# Patient Record
Sex: MALE | Race: WHITE
[De-identification: names, ages, dates, MRNs, and addresses within clinical notes are randomized per-mention and may not be internally consistent; named-entity substitution may affect disease eponyms.]

---

## 2021-09-19 ENCOUNTER — HOSPITAL ENCOUNTER (EMERGENCY)
Dept: HOSPITAL 75 - ER FS | Age: 26
Discharge: HOME | End: 2021-09-19
Payer: COMMERCIAL

## 2021-09-19 VITALS — BODY MASS INDEX: 39.2 KG/M2 | HEIGHT: 70.98 IN | WEIGHT: 279.99 LBS

## 2021-09-19 VITALS — SYSTOLIC BLOOD PRESSURE: 151 MMHG | DIASTOLIC BLOOD PRESSURE: 91 MMHG

## 2021-09-19 DIAGNOSIS — U07.1: Primary | ICD-10-CM

## 2021-09-19 DIAGNOSIS — R00.0: ICD-10-CM

## 2021-09-19 DIAGNOSIS — E86.0: ICD-10-CM

## 2021-09-19 LAB
ALBUMIN SERPL-MCNC: 4.3 GM/DL (ref 3.2–4.5)
ALP SERPL-CCNC: 74 U/L (ref 40–136)
ALT SERPL-CCNC: 46 U/L (ref 0–55)
APTT BLD: 35 SEC (ref 24–35)
BASOPHILS # BLD AUTO: 0 10^3/UL (ref 0–0.1)
BASOPHILS NFR BLD AUTO: 0 % (ref 0–10)
BILIRUB SERPL-MCNC: 0.3 MG/DL (ref 0.1–1)
BUN/CREAT SERPL: 10
CALCIUM SERPL-MCNC: 8.9 MG/DL (ref 8.5–10.1)
CHLORIDE SERPL-SCNC: 99 MMOL/L (ref 98–107)
CO2 SERPL-SCNC: 24 MMOL/L (ref 21–32)
CREAT SERPL-MCNC: 1.23 MG/DL (ref 0.6–1.3)
EOSINOPHIL # BLD AUTO: 0 10^3/UL (ref 0–0.3)
EOSINOPHIL NFR BLD AUTO: 0 % (ref 0–10)
GFR SERPLBLD BASED ON 1.73 SQ M-ARVRAT: 71 ML/MIN
GLUCOSE SERPL-MCNC: 100 MG/DL (ref 70–105)
HCT VFR BLD CALC: 48 % (ref 40–54)
HGB BLD-MCNC: 16 G/DL (ref 13.3–17.7)
INR PPP: 0.9 (ref 0.8–1.4)
LYMPHOCYTES # BLD AUTO: 0.8 X 10^3 (ref 1–4)
LYMPHOCYTES NFR BLD AUTO: 23 % (ref 12–44)
MANUAL DIFFERENTIAL PERFORMED BLD QL: NO
MCH RBC QN AUTO: 28 PG (ref 25–34)
MCHC RBC AUTO-ENTMCNC: 34 G/DL (ref 32–36)
MCV RBC AUTO: 84 FL (ref 80–99)
MONOCYTES # BLD AUTO: 0.2 X 10^3 (ref 0–1)
MONOCYTES NFR BLD AUTO: 5 % (ref 0–12)
NEUTROPHILS # BLD AUTO: 2.6 X 10^3 (ref 1.8–7.8)
NEUTROPHILS NFR BLD AUTO: 71 % (ref 42–75)
PLATELET # BLD: 160 10^3/UL (ref 130–400)
PMV BLD AUTO: 9.7 FL (ref 9–12.2)
POTASSIUM SERPL-SCNC: 4 MMOL/L (ref 3.6–5)
PROT SERPL-MCNC: 8 GM/DL (ref 6.4–8.2)
PROTHROMBIN TIME: 12.8 SEC (ref 12.2–14.7)
SODIUM SERPL-SCNC: 135 MMOL/L (ref 135–145)
WBC # BLD AUTO: 3.6 10^3/UL (ref 4.3–11)

## 2021-09-19 PROCEDURE — 93041 RHYTHM ECG TRACING: CPT

## 2021-09-19 PROCEDURE — 85730 THROMBOPLASTIN TIME PARTIAL: CPT

## 2021-09-19 PROCEDURE — 87635 SARS-COV-2 COVID-19 AMP PRB: CPT

## 2021-09-19 PROCEDURE — 93005 ELECTROCARDIOGRAM TRACING: CPT

## 2021-09-19 PROCEDURE — 36415 COLL VENOUS BLD VENIPUNCTURE: CPT

## 2021-09-19 PROCEDURE — 85025 COMPLETE CBC W/AUTO DIFF WBC: CPT

## 2021-09-19 PROCEDURE — 84484 ASSAY OF TROPONIN QUANT: CPT

## 2021-09-19 PROCEDURE — 85610 PROTHROMBIN TIME: CPT

## 2021-09-19 PROCEDURE — 86141 C-REACTIVE PROTEIN HS: CPT

## 2021-09-19 PROCEDURE — 71045 X-RAY EXAM CHEST 1 VIEW: CPT

## 2021-09-19 PROCEDURE — 80053 COMPREHEN METABOLIC PANEL: CPT

## 2021-09-19 NOTE — DIAGNOSTIC IMAGING REPORT
EXAMINATION: Chest radiograph, portable AP view.



DATE: 9/19/2021 12:57 PM



INDICATION: 25-year-old male, cough, fever, shortness of breath.



COMPARISON:  None.



FINDINGS: There is multifocal patchy bilateral lung

consolidation. Lung volumes are somewhat low. Heart size and

mediastinal contours are unremarkable. There is no identified

pneumothorax. There is no large pleural effusion.



IMPRESSION: 

1. Multifocal bilateral lung consolidation most likely reflecting

multifocal pneumonia/pneumonitis and can be seen with Covid 19

infection. Other infectious etiologies and alveolar consolidative

processes are also considered.



 



Dictated by: 



  Dictated on workstation # [id number]

## 2021-09-19 NOTE — ED GENERAL
General


Chief Complaint:  Respiratory Problems


Stated Complaint:  WEAK/SOA/COUGH


Nursing Triage Note:  


Patient reports he began having symptoms of lack of energy, shortness of breath,




cough, and loss of taste and smell on 9/10/21.


Source of Information:  Patient





History of Present Illness


Date Seen by Provider:  Sep 19, 2021


Time Seen by Provider:  12:02


Initial Comments


25-year-old male presenting with complaints of decreased energy, shortness of 

breath, cough and low-grade fever with chills since Sept 10.  He had a tooth 

pulled on  and was told that he would likely have a sinus infection 

so he was placed on Augmentin.  He has continued to feel bad ever since.  This 

morning he had nausea and vomiting after trying to take Seroquel for his 

symptoms.  He finally decided to come be seen because of feeling bad and having 

no energy.  He did take Tylenol around 930 for his dental pain from having the 

tooth pulled.  He had decreased taste and smell.  Anytime he tries to take a 

deep breath he starts coughing.  He states he is not really bringing anything up

when he coughs.


Associated Systoms:  Chest Pain (from coughing), Cough, Fever/Chills (low grade)





Allergies and Home Medications


Allergies


Coded Allergies:  


     No Known Drug Allergies (Unverified , 21)





Patient Home Medication List


Home Medication List Reviewed:  Yes


Ondansetron (Ondansetron Odt) 4 Mg Tab.rapdis, 4 MG PO Q6H PRN for 

NAUSEA/VOMITING


   Prescribed by: REBECCA HUFF on 21 1413


Prednisone (Prednisone) 20 Mg Tab, 40 MG PO DAILY


   Prescribed by: REBECCA HUFF on 21 1413





Review of Systems


Review of Systems


Constitutional:  chills, dizziness, fever, malaise, weakness


EENTM:  hoarseness, nose congestion; No epistaxis


Respiratory:  cough


Cardiovascular:  No edema


Gastrointestinal:  see HPI, nausea, vomiting (today)


Genitourinary:  no symptoms reported


Musculoskeletal:  other (generalized body aches)


Skin:  no symptoms reported


Psychiatric/Neurological:  Headache


Hematologic/Lymphatic:  Denies Blood Clots, Denies Easy Bleeding, Denies Easy 

Bruising





Past Medical-Social-Family Hx


Patient Social History


Tobacco Use?:  No


Substance use?:  No


Alcohol Use?:  Yes


Alcohol Frequency:  Once in a while


Pt feels they are or have been:  No





Past Medical History


Surgeries:  No


Respiratory:  No


Cardiac:  No


Neurological:  No


Genitourinary:  No


Gastrointestinal:  No


Musculoskeletal:  No


Endocrine:  No


HEENT:  No


Cancer:  No


Psychosocial:  No





Physical Exam


Vital Signs





Vital Signs - First Documented








 21





 11:40


 


Temp 37.9


 


Pulse 93


 


Resp 18


 


B/P (MAP) 148/84 (105)


 


Pulse Ox 96


 


O2 Delivery Room Air





Capillary Refill : Less Than 3 Seconds


Height, Weight, BMI


Height: '"


Weight: lbs. oz. kg; 39.00 BMI


Method:


General Appearance:  No Apparent Distress, Other (appears to not feel well)


HEENT:  PERRL/EOMI; No Moist Mucous Membranes (slightly dry mucous membranes)


Neck:  Full Range of Motion, Supple


Respiratory:  Chest Non Tender, No Accessory Muscle Use, No Respiratory 

Distress, Decreased Breath Sounds


Cardiovascular:  Normal Peripheral Pulses, Tachycardia


Gastrointestinal:  Normal Bowel Sounds, No Pulsatile Mass, Non Tender, Soft


Rectal:  Deferred


Extremity:  Normal Capillary Refill, Normal Inspection, No Pedal Edema


Neurologic/Psychiatric:  Alert, Oriented x3, No Motor/Sensory Deficits, Normal 

Mood/Affect, CNs II-XII Norm as Tested


Skin:  Normal Color, Warm/Dry





Progress/Results/Core Measures


Suspected Sepsis


SIRS


Temperature: 


Pulse: 93 


Respiratory Rate: 18


 


Laboratory Tests


21 13:10: White Blood Count 3.6L


Blood Pressure 148 /84 


Mean: 105


 





Laboratory Tests


21 13:10: 


Creatinine 1.23, INR Comment 0.9, Platelet Count 160, Total Bilirubin 0.3








Results/Orders


Lab Results





Laboratory Tests








Test


 21


11:43 21


13:10 Range/Units


 


 


White Blood Count


 


 3.6 L


 4.3-11.0


10^3/uL


 


Red Blood Count


 


 5.65 H


 4.30-5.52


10^6/uL


 


Hemoglobin  16.0  13.3-17.7  g/dL


 


Hematocrit  48  40-54  %


 


Mean Corpuscular Volume  84  80-99  fL


 


Mean Corpuscular Hemoglobin  28  25-34  pg


 


Mean Corpuscular Hemoglobin


Concent 


 34 


 32-36  g/dL





 


Red Cell Distribution Width  12.8  10.0-14.5  %


 


Platelet Count


 


 160 


 130-400


10^3/uL


 


Mean Platelet Volume  9.7  9.0-12.2  fL


 


Immature Granulocyte % (Auto)  0   %


 


Neutrophils (%) (Auto)  71  42-75  %


 


Lymphocytes (%) (Auto)  23  12-44  %


 


Monocytes (%) (Auto)  5  0-12  %


 


Eosinophils (%) (Auto)  0  0-10  %


 


Basophils (%) (Auto)  0  0-10  %


 


Neutrophils # (Auto)  2.6  1.8-7.8  X 10^3


 


Lymphocytes # (Auto)  0.8 L 1.0-4.0  X 10^3


 


Monocytes # (Auto)  0.2  0.0-1.0  X 10^3


 


Eosinophils # (Auto)


 


 0.0 


 0.0-0.3


10^3/uL


 


Basophils # (Auto)


 


 0.0 


 0.0-0.1


10^3/uL


 


Immature Granulocyte # (Auto)


 


 0.0 


 0.0-0.1


10^3/uL


 


Prothrombin Time  12.8  12.2-14.7  SEC


 


INR Comment  0.9  0.8-1.4  


 


Activated Partial


Thromboplast Time 


 35 


 24-35  SEC





 


Sodium Level  135  135-145  MMOL/L


 


Potassium Level  4.0  3.6-5.0  MMOL/L


 


Chloride Level  99    MMOL/L


 


Carbon Dioxide Level  24  21-32  MMOL/L


 


Anion Gap  12  5-14  MMOL/L


 


Blood Urea Nitrogen  12  7-18  MG/DL


 


Creatinine


 


 1.23 


 0.60-1.30


MG/DL


 


Estimat Glomerular Filtration


Rate 


 71 


  





 


BUN/Creatinine Ratio  10   


 


Glucose Level  100    MG/DL


 


Calcium Level  8.9  8.5-10.1  MG/DL


 


Corrected Calcium  8.7  8.5-10.1  MG/DL


 


Total Bilirubin  0.3  0.1-1.0  MG/DL


 


Aspartate Amino Transf


(AST/SGOT) 


 52 H


 5-34  U/L





 


Alanine Aminotransferase


(ALT/SGPT) 


 46 


 0-55  U/L





 


Alkaline Phosphatase  74    U/L


 


Troponin I  < 0.30  <0.30  NG/ML


 


C-Reactive Protein  2.98 H <0.50  MG/DL


 


Total Protein  8.0  6.4-8.2  GM/DL


 


Albumin  4.3  3.2-4.5  GM/DL








My Orders





Orders - REBECCA HUFF MD


Monitor-Rhythm Ecg Trace Only (21 12:34)


Ed Iv/Invasive Line Start (21 12:34)


Cbc With Automated Diff (21 12:34)


Comprehensive Metabolic Panel (21 12:34)


Crp Fs (21 12:34)


Troponin I Fs (21 12:34)


Protime With Inr (21 12:34)


Partial Thromboplastin Time (21 12:34)


Ekg Tracing (21 12:34)


Ns Iv 1000 Ml (Sodium Chloride 0.9%) (21 12:45)


Ondansetron Injection (Zofran Injectio (21 12:45)


Albuterol Inhaler (Albuterol) (21 12:45)


Chest 1 View Ap/Pa Only (21 12:34)


Dexamethasone Injection (Decadron Inje (21 12:34)


Nursing Communication (Order) (21 12:34)


Coronavirus Sars-Cov-2 So  (21 12:38)


Influenza A & B Antigens (21 12:38)





Medications Given in ED





Current Medications








 Medications  Dose


 Ordered  Sig/Afia


 Route  Start Time


 Stop Time Status Last Admin


Dose Admin


 


 Albuterol Sulfate  2 PUFFS  ONCE  PRN


 IH  21 12:45


 21 14:36 DC 21 13:12


8.5 GM


 


 Ondansetron HCl  4 mg  ONCE  ONCE


 IV  21 12:45


 21 12:46 DC 21 13:12


4 MG








Vital Signs/I&O











 21





 11:40 11:56 14:33


 


Temp 37.9  


 


Pulse 93  87


 


Resp 18  23


 


B/P (MAP) 148/84 (105)  151/91


 


Pulse Ox 96  97


 


O2 Delivery Room Air Room Air Room Air





Capillary Refill : Less Than 3 Seconds








Blood Pressure Mean:                    105








Progress Note #1:  


Progress Note


Obtain basic labs as well as cultures and lactic acid with chest x-ray and 

electrocardiogram.  Since he is slightly tachycardic he did have nausea and 

vomiting and complains of being dizzy we will give a liter of normal saline for 

hydration.  Try a dose of Decadron to help with his breathing, Zofran for 

nausea, albuterol inhaler with spacer to help with his cough and breathing.


Progress Note #2:  


Progress Note


Labs show WBC slightly low at 3.6 and CRP was elevated to 1.23. He had negative 

cardiac enzymes and no acute abnormality on ECG.  Chest x-ray was showing patchy

opacities consistent with Covid pneumonia.  Counseled patient that with use 

antibiotic, steroid, albuterol inhaler with spacer to help with his treatment 

and symptoms.  The Covid swab will be back in 24 to 48 hours.  However at this 

time presumably has Covid and continue with quarantine until symptoms are 

better.  Establish care with a local provider for follow-up.  Return if any 

worsening symptoms or not improving





ECG


Initial ECG Impression Date:  Sep 19, 2021


Initial ECG Impression Time:  13:16


Initial ECG Rate:  99


Initial ECG Rhythm:  Normal Sinus


Initial ECG Comparisson:  No Previous ECG Available


Comment


Normal sinus rhythm with heart rate of 99 bpm.  TN interval 155 ms.  QT interval

343 ms with a QTc interval 441 ms.  There is no acute ST elevation.  There is no

prior tracing available for comparison.





Diagnostic Imaging





   Diagonstic Imaging:  Xray


   Plain Films/CT/US/NM/MRI:  chest


Comments


                 [facility].


                                [place], [place]





NAME:   MARGARITA MORALES


[facility] REC#:   [id number]


ACCOUNT#:   [id number]


PT STATUS:   REG ER


:   1995


PHYSICIAN:   REBECCA HUFF MD


ADMIT DATE:   21/ER FS


                                  ***Signed***


Date of Exam:21





CHEST 1 VIEW AP/PA ONLY








EXAMINATION: Chest radiograph, portable AP view.





DATE: 2021 12:57 PM





INDICATION: 25-year-old male, cough, fever, shortness of breath.





COMPARISON:  None.





FINDINGS: There is multifocal patchy bilateral lung


consolidation. Lung volumes are somewhat low. Heart size and


mediastinal contours are unremarkable. There is no identified


pneumothorax. There is no large pleural effusion.





IMPRESSION: 


1. Multifocal bilateral lung consolidation most likely reflecting


multifocal pneumonia/pneumonitis and can be seen with Covid 19


infection. Other infectious etiologies and alveolar consolidative


processes are also considered.





 





Dictated by: 





  Dictated on workstation # [id number]








Dict:   21 1304


Trans:   21 1335


[facility] [id number]





Interpreted by:     CHRISTIANO ZAPATA MD


Electronically signed by: CHRISTIANO ZAPATA MD [id number]


   Reviewed:  Reviewed by Me





Departure


Impression





   Primary Impression:  


   Upper respiratory infection with cough and congestion


   Additional Impressions:  


   Person under investigation for COVID-19


   Dehydration


Disposition:  01 HOME, SELF-CARE


Condition:  Stable





Departure-Patient Inst.


Decision time for Depature:  14:09


Referrals:  


[facility] [place]


Patient Instructions:  COVID-19 ED, COVID-19 Tests, Dehydration, Adult ED, How 

to Use a Metered Dose Inhaler ED, How to Use a Spacer, Upper Respiratory 

Infection ED





Add. Discharge Instructions:  


Take steroid to help with cough, body aches and congestion.





Use inhaler with spacer to help with cough and shortness of breath.





The results of your Covid swab will be done in 24-48 hours and you will get a 

call if it is positive. Until then presume that you have it and continue to 

quarantine until you are over your symptoms.





You could call [phone] to get established with [facility] for a primary provider 

for follow up





All discharge instructions reviewed with patient and/or family. Voiced 

understanding.


Scripts


Ondansetron (Ondansetron Odt) 4 Mg Tab.rapdis


4 MG PO Q6H PRN for NAUSEA/VOMITING for 4 Days, #16 TAB 0 Refills


   Prov: REBECCA HUFF MD         21 


Prednisone (Prednisone) 20 Mg Tab


40 MG PO DAILY for 5 Days, #10 TAB 0 Refills


   Prov: REBECCA HUFF MD         21











REBECCA HUFF MD               Sep 19, 2021 12:02

## 2021-09-22 ENCOUNTER — HOSPITAL ENCOUNTER (INPATIENT)
Dept: HOSPITAL 75 - 4TH | Age: 26
LOS: 8 days | Discharge: HOME | DRG: 177 | End: 2021-09-30
Attending: FAMILY MEDICINE | Admitting: INTERNAL MEDICINE
Payer: COMMERCIAL

## 2021-09-22 VITALS — DIASTOLIC BLOOD PRESSURE: 88 MMHG | SYSTOLIC BLOOD PRESSURE: 166 MMHG

## 2021-09-22 VITALS — DIASTOLIC BLOOD PRESSURE: 84 MMHG | SYSTOLIC BLOOD PRESSURE: 148 MMHG

## 2021-09-22 VITALS — WEIGHT: 275.58 LBS | BODY MASS INDEX: 38.58 KG/M2 | HEIGHT: 70.98 IN

## 2021-09-22 VITALS — DIASTOLIC BLOOD PRESSURE: 90 MMHG | SYSTOLIC BLOOD PRESSURE: 148 MMHG

## 2021-09-22 DIAGNOSIS — I95.9: ICD-10-CM

## 2021-09-22 DIAGNOSIS — J80: ICD-10-CM

## 2021-09-22 DIAGNOSIS — E87.1: ICD-10-CM

## 2021-09-22 DIAGNOSIS — U07.1: Primary | ICD-10-CM

## 2021-09-22 DIAGNOSIS — J12.82: ICD-10-CM

## 2021-09-22 DIAGNOSIS — Z73.0: ICD-10-CM

## 2021-09-22 DIAGNOSIS — G47.33: ICD-10-CM

## 2021-09-22 DIAGNOSIS — I10: ICD-10-CM

## 2021-09-22 DIAGNOSIS — E83.41: ICD-10-CM

## 2021-09-22 DIAGNOSIS — T38.0X5A: ICD-10-CM

## 2021-09-22 DIAGNOSIS — F17.220: ICD-10-CM

## 2021-09-22 DIAGNOSIS — R73.9: ICD-10-CM

## 2021-09-22 DIAGNOSIS — Z79.899: ICD-10-CM

## 2021-09-22 LAB
ALBUMIN SERPL-MCNC: 3.7 GM/DL (ref 3.2–4.5)
ALP SERPL-CCNC: 56 U/L (ref 40–136)
ALT SERPL-CCNC: 72 U/L (ref 0–55)
ARTERIAL PATENCY WRIST A: (no result)
BASE EXCESS STD BLDA CALC-SCNC: 0.9 MMOL/L (ref -2.5–2.5)
BASOPHILS # BLD AUTO: 0 10^3/UL (ref 0–0.1)
BASOPHILS NFR BLD AUTO: 0 % (ref 0–10)
BDY SITE: (no result)
BILIRUB SERPL-MCNC: 0.6 MG/DL (ref 0.1–1)
BODY TEMPERATURE: 36.4
BUN/CREAT SERPL: 15
CALCIUM SERPL-MCNC: 8.7 MG/DL (ref 8.5–10.1)
CHLORIDE SERPL-SCNC: 106 MMOL/L (ref 98–107)
CO2 BLDA CALC-SCNC: 26.4 MMOL/L (ref 21–31)
CO2 SERPL-SCNC: 23 MMOL/L (ref 21–32)
CREAT SERPL-MCNC: 0.84 MG/DL (ref 0.6–1.3)
EOSINOPHIL # BLD AUTO: 0 10^3/UL (ref 0–0.3)
EOSINOPHIL NFR BLD AUTO: 0 % (ref 0–10)
GFR SERPLBLD BASED ON 1.73 SQ M-ARVRAT: 110 ML/MIN
GLUCOSE SERPL-MCNC: 162 MG/DL (ref 70–105)
HCT VFR BLD CALC: 43 % (ref 40–54)
HGB BLD-MCNC: 14.5 G/DL (ref 13.3–17.7)
INHALED O2 FLOW RATE: 5 L/MIN
LYMPHOCYTES # BLD AUTO: 0.7 10^3/UL (ref 1–4)
LYMPHOCYTES NFR BLD AUTO: 11 % (ref 12–44)
MANUAL DIFFERENTIAL PERFORMED BLD QL: NO
MCH RBC QN AUTO: 28 PG (ref 25–34)
MCHC RBC AUTO-ENTMCNC: 34 G/DL (ref 32–36)
MCV RBC AUTO: 84 FL (ref 80–99)
MONOCYTES # BLD AUTO: 0.2 10^3/UL (ref 0–1)
MONOCYTES NFR BLD AUTO: 4 % (ref 0–12)
NEUTROPHILS # BLD AUTO: 5 10^3/UL (ref 1.8–7.8)
NEUTROPHILS NFR BLD AUTO: 85 % (ref 42–75)
PCO2 BLDA: 40 MMHG (ref 35–45)
PH BLDA: 7.41 [PH] (ref 7.37–7.43)
PLATELET # BLD: 210 10^3/UL (ref 130–400)
PMV BLD AUTO: 9.8 FL (ref 9–12.2)
PO2 BLDA: 62 MMHG (ref 79–93)
POTASSIUM SERPL-SCNC: 4.3 MMOL/L (ref 3.6–5)
PROT SERPL-MCNC: 7.1 GM/DL (ref 6.4–8.2)
SAO2 % BLDA FROM PO2: 93 % (ref 94–100)
SODIUM SERPL-SCNC: 140 MMOL/L (ref 135–145)
VENTILATION MODE VENT: NO
WBC # BLD AUTO: 5.9 10^3/UL (ref 4.3–11)

## 2021-09-22 PROCEDURE — 80053 COMPREHEN METABOLIC PANEL: CPT

## 2021-09-22 PROCEDURE — 84100 ASSAY OF PHOSPHORUS: CPT

## 2021-09-22 PROCEDURE — 83735 ASSAY OF MAGNESIUM: CPT

## 2021-09-22 PROCEDURE — 71045 X-RAY EXAM CHEST 1 VIEW: CPT

## 2021-09-22 PROCEDURE — 83036 HEMOGLOBIN GLYCOSYLATED A1C: CPT

## 2021-09-22 PROCEDURE — 85025 COMPLETE CBC W/AUTO DIFF WBC: CPT

## 2021-09-22 PROCEDURE — 85379 FIBRIN DEGRADATION QUANT: CPT

## 2021-09-22 PROCEDURE — 84145 PROCALCITONIN (PCT): CPT

## 2021-09-22 PROCEDURE — 94660 CPAP INITIATION&MGMT: CPT

## 2021-09-22 PROCEDURE — 94761 N-INVAS EAR/PLS OXIMETRY MLT: CPT

## 2021-09-22 PROCEDURE — 94640 AIRWAY INHALATION TREATMENT: CPT

## 2021-09-22 PROCEDURE — 80048 BASIC METABOLIC PNL TOTAL CA: CPT

## 2021-09-22 PROCEDURE — 36415 COLL VENOUS BLD VENIPUNCTURE: CPT

## 2021-09-22 PROCEDURE — 5A0945A ASSISTANCE WITH RESPIRATORY VENTILATION, 24-96 CONSECUTIVE HOURS, HIGH NASAL FLOW/VELOCITY: ICD-10-PCS | Performed by: INTERNAL MEDICINE

## 2021-09-22 PROCEDURE — 94760 N-INVAS EAR/PLS OXIMETRY 1: CPT

## 2021-09-22 PROCEDURE — 82947 ASSAY GLUCOSE BLOOD QUANT: CPT

## 2021-09-22 PROCEDURE — 86141 C-REACTIVE PROTEIN HS: CPT

## 2021-09-22 PROCEDURE — 94664 DEMO&/EVAL PT USE INHALER: CPT

## 2021-09-22 PROCEDURE — 82805 BLOOD GASES W/O2 SATURATION: CPT

## 2021-09-22 RX ADMIN — ALBUTEROL SULFATE SCH GM: 90 AEROSOL, METERED RESPIRATORY (INHALATION) at 19:40

## 2021-09-22 RX ADMIN — INSULIN ASPART SCH UNIT: 100 INJECTION, SOLUTION INTRAVENOUS; SUBCUTANEOUS at 22:21

## 2021-09-22 RX ADMIN — ENOXAPARIN SODIUM SCH MG: 100 INJECTION SUBCUTANEOUS at 18:15

## 2021-09-22 RX ADMIN — FLUTICASONE PROPIONATE AND SALMETEROL SCH EACH: 113; 14 POWDER, METERED RESPIRATORY (INHALATION) at 19:40

## 2021-09-22 RX ADMIN — FAMOTIDINE SCH MG: 20 TABLET, FILM COATED ORAL at 19:55

## 2021-09-22 RX ADMIN — INSULIN ASPART SCH UNIT: 100 INJECTION, SOLUTION INTRAVENOUS; SUBCUTANEOUS at 18:14

## 2021-09-22 RX ADMIN — DOCUSATE SODIUM AND SENNOSIDES SCH EA: 8.6; 5 TABLET, FILM COATED ORAL at 20:47

## 2021-09-22 RX ADMIN — POLYETHYLENE GLYCOL (3350) SCH GM: 17 POWDER, FOR SOLUTION ORAL at 20:47

## 2021-09-22 RX ADMIN — GUAIFENESIN SCH MG: 600 TABLET, EXTENDED RELEASE ORAL at 19:55

## 2021-09-22 NOTE — DIAGNOSTIC IMAGING REPORT
INDICATION: COVID positive, shortness of breath.



TECHNIQUE: Frontal chest obtained at 05:24 p.m. and compared with

09/19/2021.



FINDINGS: Heart is borderline in size. The mediastinal silhouette

is unremarkable. There are patchy infiltrates throughout both

lungs which have shown some worsening compared to the previous

study. There is no pneumothorax or pleural fluid.



IMPRESSION: Worsening bilateral infiltrates, compatible with

pneumonia. No pneumothorax or pleural fluid.



Dictated by: 



  Dictated on workstation # [id number]

## 2021-09-22 NOTE — HISTORY & PHYSICAL-HOSPITALIST
History of Present Illness


HPI/Chief Complaint


CC: SOB from Covid-19 pneumonia 





HPI: This is a 25yoWM who [place] [facility] [place] [facility] [place] 

to be hypoxic and SOB, Pt was assessed in the ER found to have hypoxia and 

ABG/PO2 of 50. He required 7 liters of oxygen, his BMI is 40 and since he is at 

high risk for intubation he will be moved to cardiac step-down to be closer to 

pulmonary critical care. 





To note he reports he is not diabetic but he is having hyperglycemia with the 

steroid administration in addition he appears to have sleep apnea undiagnosed 

but he is a lifetime non-smoker.


Source:  patient


Exam Limitations:  no limitations


Date Seen


9/22/21


Time Seen by a Provider:  18:00


Attending Physician


Ellen Ta DO


PCP


No,Local Physician


Referring Physician





Date of Admission


Sep 22, 2021 at 16:21





Home Medications & Allergies


Home Medications


Reviewed patient Home Medication Reconciliation performed by pharmacy medication

reconciliations technician and/or nursing.


Patients Allergies have been reviewed.





Allergies





Allergies


Coded Allergies


  No Known Drug Allergies (Unverified9/19/21)








Past Medical-Social-Family Hx


Patient Social History


Marrital Status:  


Employed/Student:  employed ([employer] 12 years)


Tobacco Use?:  No


Smoking Status:  Never a Smoker


Smokeless type used:  Chew


Smokeless Tobacco Frequency:  Current Everyday User


Use of E-Cig and/or Vaping dev:  No


Use of E-Cig and/or Vaping Yogi:  Never a User


Substance use?:  No


Alcohol Use?:  Yes


Alcohol type:  Beer


Alcohol Frequency:  Once in a while


Pt feels they are or have been:  No





Immunizations Up To Date


Tetanus Booster (TDap):  Unknown


Hepatitis A:  No


Hepatitis B:  No





Current Status


Advance Directives:  No


Communicates:  Verbally


Primary Language:  English


Preferred Spoken Language:  English


Is interpretation needed?:  No





Past Medical History


High Cholesterol





Review of Systems


Constitutional:  see HPI, fever, malaise, weakness


EENTM:  no symptoms reported


Respiratory:  dyspnea on exertion, short of breath


Cardiovascular:  no symptoms reported


Gastrointestinal:  no symptoms reported


Genitourinary:  no symptoms reported


Musculoskeletal:  no symptoms reported


Skin:  no symptoms reported


Psychiatric/Neurological:  No Symptoms Reported


All Other Systems Reviewed


Negative Unless Noted:  Yes





Physical Exam


Physical Exam


Vital Signs





Vital Signs - First Documented








 9/22/21 9/22/21





 17:12 18:56


 


Temp 37.2 


 


Pulse 75 


 


Resp 20 


 


B/P (MAP) 166/88 (114) 


 


Pulse Ox 93 


 


O2 Delivery High Flow N/C 


 


O2 Flow Rate 7.00 


 


FiO2  21





Capillary Refill :


Height, Weight, BMI


Height: '"


Weight: lbs. oz. kg; 40.35 BMI


Method:


General Appearance:  No Apparent Distress, WD/WN, Obese


Eyes:  Right Eye Normal Inspection, Right Eye PERRL


HEENT:  PERRL/EOMI, Normal ENT Inspection, Pharynx Normal, Moist Mucous 

Membranes


Neck:  Full Range of Motion, Normal Inspection, Non Tender


Respiratory:  Chest Non Tender, Normal Breath Sounds, No Accessory Muscle Use, 

No Respiratory Distress, Crackles


Cardiovascular:  Regular Rate, Rhythm, No Edema, No Gallop, No JVD, No Murmur, 

Normal Peripheral Pulses


Gastrointestinal:  Normal Bowel Sounds, No Organomegaly, No Pulsatile Mass, Non 

Tender, Soft


Back:  Normal Inspection, No CVA Tenderness, No Vertebral Tenderness


Extremity:  Normal Capillary Refill, Normal Inspection, Normal Range of Motion, 

Non Tender, No Calf Tenderness, No Pedal Edema


Neurologic/Psychiatric:  Alert, Oriented x3, No Motor/Sensory Deficits, Normal 

Mood/Affect


Skin:  Normal Color, Warm/Dry


Lymphatic:  No Adenopathy





Results


Results/Procedures


Labs


Laboratory Tests


9/22/21 19:45








Patient resulted labs reviewed.





Assessment/Plan


Admission Diagnosis


Assessment:


Acute hypoxic respiratory failure


COVID-19 pneumonia


Suspicion for obstructive sleep apnea


Hyperglycemia rule out diabetes


BMI 40





Plan:


Decadron


Vapotherm


Pulmonology consult


Admission Status:  Inpatient Order (span 2 midnights)


Reason for Inpatient Admission:  


COVID-19 pneumonia











ELLEN TA DO                Sep 22, 2021 20:43

## 2021-09-23 VITALS — DIASTOLIC BLOOD PRESSURE: 57 MMHG | SYSTOLIC BLOOD PRESSURE: 123 MMHG

## 2021-09-23 VITALS — DIASTOLIC BLOOD PRESSURE: 86 MMHG | SYSTOLIC BLOOD PRESSURE: 139 MMHG

## 2021-09-23 VITALS — SYSTOLIC BLOOD PRESSURE: 136 MMHG | DIASTOLIC BLOOD PRESSURE: 93 MMHG

## 2021-09-23 VITALS — DIASTOLIC BLOOD PRESSURE: 88 MMHG | SYSTOLIC BLOOD PRESSURE: 145 MMHG

## 2021-09-23 VITALS — SYSTOLIC BLOOD PRESSURE: 140 MMHG | DIASTOLIC BLOOD PRESSURE: 81 MMHG

## 2021-09-23 VITALS — DIASTOLIC BLOOD PRESSURE: 65 MMHG | SYSTOLIC BLOOD PRESSURE: 139 MMHG

## 2021-09-23 VITALS — DIASTOLIC BLOOD PRESSURE: 78 MMHG | SYSTOLIC BLOOD PRESSURE: 136 MMHG

## 2021-09-23 VITALS — DIASTOLIC BLOOD PRESSURE: 80 MMHG | SYSTOLIC BLOOD PRESSURE: 134 MMHG

## 2021-09-23 VITALS — SYSTOLIC BLOOD PRESSURE: 121 MMHG | DIASTOLIC BLOOD PRESSURE: 72 MMHG

## 2021-09-23 VITALS — SYSTOLIC BLOOD PRESSURE: 131 MMHG | DIASTOLIC BLOOD PRESSURE: 78 MMHG

## 2021-09-23 VITALS — SYSTOLIC BLOOD PRESSURE: 140 MMHG | DIASTOLIC BLOOD PRESSURE: 79 MMHG

## 2021-09-23 LAB
ALBUMIN SERPL-MCNC: 3.3 GM/DL (ref 3.2–4.5)
ALP SERPL-CCNC: 47 U/L (ref 40–136)
ALT SERPL-CCNC: 63 U/L (ref 0–55)
BASOPHILS # BLD AUTO: 0 10^3/UL (ref 0–0.1)
BASOPHILS NFR BLD AUTO: 0 % (ref 0–10)
BILIRUB SERPL-MCNC: 0.6 MG/DL (ref 0.1–1)
BUN/CREAT SERPL: 15
CALCIUM SERPL-MCNC: 8.3 MG/DL (ref 8.5–10.1)
CHLORIDE SERPL-SCNC: 107 MMOL/L (ref 98–107)
CO2 SERPL-SCNC: 20 MMOL/L (ref 21–32)
CREAT SERPL-MCNC: 0.79 MG/DL (ref 0.6–1.3)
EOSINOPHIL # BLD AUTO: 0 10^3/UL (ref 0–0.3)
EOSINOPHIL NFR BLD AUTO: 0 % (ref 0–10)
GFR SERPLBLD BASED ON 1.73 SQ M-ARVRAT: 118 ML/MIN
GLUCOSE SERPL-MCNC: 117 MG/DL (ref 70–105)
HCT VFR BLD CALC: 42 % (ref 40–54)
HGB BLD-MCNC: 14.1 G/DL (ref 13.3–17.7)
LYMPHOCYTES # BLD AUTO: 0.9 10^3/UL (ref 1–4)
LYMPHOCYTES NFR BLD AUTO: 10 % (ref 12–44)
MANUAL DIFFERENTIAL PERFORMED BLD QL: NO
MCH RBC QN AUTO: 28 PG (ref 25–34)
MCHC RBC AUTO-ENTMCNC: 33 G/DL (ref 32–36)
MCV RBC AUTO: 84 FL (ref 80–99)
MONOCYTES # BLD AUTO: 0.4 10^3/UL (ref 0–1)
MONOCYTES NFR BLD AUTO: 4 % (ref 0–12)
NEUTROPHILS # BLD AUTO: 7.7 10^3/UL (ref 1.8–7.8)
NEUTROPHILS NFR BLD AUTO: 85 % (ref 42–75)
PLATELET # BLD: 207 10^3/UL (ref 130–400)
PMV BLD AUTO: 9.6 FL (ref 9–12.2)
POTASSIUM SERPL-SCNC: 4.6 MMOL/L (ref 3.6–5)
PROT SERPL-MCNC: 6.8 GM/DL (ref 6.4–8.2)
SODIUM SERPL-SCNC: 138 MMOL/L (ref 135–145)
WBC # BLD AUTO: 9 10^3/UL (ref 4.3–11)

## 2021-09-23 RX ADMIN — DOCUSATE SODIUM AND SENNOSIDES SCH EA: 8.6; 5 TABLET, FILM COATED ORAL at 20:53

## 2021-09-23 RX ADMIN — ALBUTEROL SULFATE SCH GM: 90 AEROSOL, METERED RESPIRATORY (INHALATION) at 10:54

## 2021-09-23 RX ADMIN — FAMOTIDINE SCH MG: 20 TABLET, FILM COATED ORAL at 08:09

## 2021-09-23 RX ADMIN — ENOXAPARIN SODIUM SCH MG: 100 INJECTION SUBCUTANEOUS at 17:59

## 2021-09-23 RX ADMIN — POLYETHYLENE GLYCOL (3350) SCH GM: 17 POWDER, FOR SOLUTION ORAL at 08:09

## 2021-09-23 RX ADMIN — FAMOTIDINE SCH MG: 20 TABLET, FILM COATED ORAL at 20:53

## 2021-09-23 RX ADMIN — ALBUTEROL SULFATE SCH GM: 90 AEROSOL, METERED RESPIRATORY (INHALATION) at 07:16

## 2021-09-23 RX ADMIN — FLUTICASONE PROPIONATE AND SALMETEROL SCH EACH: 113; 14 POWDER, METERED RESPIRATORY (INHALATION) at 07:17

## 2021-09-23 RX ADMIN — INSULIN ASPART SCH UNIT: 100 INJECTION, SOLUTION INTRAVENOUS; SUBCUTANEOUS at 05:45

## 2021-09-23 RX ADMIN — ALBUTEROL SULFATE SCH PUFF: 90 AEROSOL, METERED RESPIRATORY (INHALATION) at 22:12

## 2021-09-23 RX ADMIN — MELATONIN 3 MG ORAL TABLET SCH MG: 3 TABLET ORAL at 20:53

## 2021-09-23 RX ADMIN — INSULIN ASPART SCH UNIT: 100 INJECTION, SOLUTION INTRAVENOUS; SUBCUTANEOUS at 11:32

## 2021-09-23 RX ADMIN — GUAIFENESIN SCH MG: 600 TABLET, EXTENDED RELEASE ORAL at 20:53

## 2021-09-23 RX ADMIN — POLYETHYLENE GLYCOL (3350) SCH GM: 17 POWDER, FOR SOLUTION ORAL at 20:53

## 2021-09-23 RX ADMIN — GUAIFENESIN AND CODEINE PHOSPHATE PRN ML: 100; 10 SOLUTION ORAL at 15:50

## 2021-09-23 RX ADMIN — GUAIFENESIN SCH MG: 600 TABLET, EXTENDED RELEASE ORAL at 08:09

## 2021-09-23 RX ADMIN — ALBUTEROL SULFATE SCH PUFF: 90 AEROSOL, METERED RESPIRATORY (INHALATION) at 18:40

## 2021-09-23 RX ADMIN — GUAIFENESIN AND CODEINE PHOSPHATE PRN ML: 100; 10 SOLUTION ORAL at 07:00

## 2021-09-23 RX ADMIN — INSULIN ASPART SCH UNIT: 100 INJECTION, SOLUTION INTRAVENOUS; SUBCUTANEOUS at 20:53

## 2021-09-23 RX ADMIN — ALBUTEROL SULFATE SCH GM: 90 AEROSOL, METERED RESPIRATORY (INHALATION) at 14:24

## 2021-09-23 RX ADMIN — GUAIFENESIN AND CODEINE PHOSPHATE PRN ML: 100; 10 SOLUTION ORAL at 02:52

## 2021-09-23 RX ADMIN — FLUTICASONE PROPIONATE AND SALMETEROL SCH EACH: 113; 14 POWDER, METERED RESPIRATORY (INHALATION) at 18:40

## 2021-09-23 RX ADMIN — ALBUTEROL SULFATE SCH GM: 90 AEROSOL, METERED RESPIRATORY (INHALATION) at 02:29

## 2021-09-23 RX ADMIN — INSULIN ASPART SCH UNIT: 100 INJECTION, SOLUTION INTRAVENOUS; SUBCUTANEOUS at 16:00

## 2021-09-23 RX ADMIN — DOCUSATE SODIUM AND SENNOSIDES SCH EA: 8.6; 5 TABLET, FILM COATED ORAL at 08:09

## 2021-09-23 NOTE — PULMONARY CONSULTATION
History of Present Illness


History of Present Illness


Date Seen by Provider:  Sep 23, 2021


Time Seen by Provider:  11:09


Reason for Visit:  COVID PNA


History of Present Illness


Sx since 9/12, admitted yesterday


On floor for COVID, on vapotherm 35 lpm FiO2 85%, was on 7 lpm yesterday was on 

80%. Has dry cough, no hemotysis, mild chest pain, + body aches


Did not get vaccinated. On Decadron, out of window for remdesivir or Regen-COV. 

D dimer ok





+ HTN, -DM, -HLD no asthma,No Heart disease


non smoker, 


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


To get Tocilizumab today, 


Took ivermectin at home.





Allergies and Home Medications


Allergies


Coded Allergies:  


     No Known Drug Allergies (Unverified , 9/19/21)





Home Medications


Acetaminophen 500 Mg Tablet, 1,000 MG PO Q8H PRN for PAIN-MILD (1-4), (Reported)


Acetaminophen with Codeine 1 Each Tablet, 1 EA PO Q8H PRN for PAIN-MODERATE (5-

7), (Reported)


Amoxicillin/Potassium Clav 1 Each Tablet, 1 EA PO BID, (Reported)


   FILLED 09- #20/10 DAY SUPPLY 


Ascorbic Acid 500 Mg Tablet, 500 MG PO DAILY, (Reported)


Azithromycin 250 Mg Tablet, 250 MG PO DAILY, (Reported)


   FILLED 09- #6/5 DAY SUPPLY 


Budesonide 0.5 Mg/2 Ml Ampul.neb, 0.5 MG NEB BID PRN for WHEEZING, (Reported)


   USE AFTER ALBUTEROL TREATMENT 


Cholecalciferol (Vitamin D3) 50 Mcg Tablet, 50 MCG PO DAILY, (Reported)


Dexamethasone 4 Mg Tablet, MG PO DAILY, (Reported)


   FILLED 09- #8/10 DAY SUPPLY 1 TAB DAILY X 5 DAYS THEN  TAB DAILY X 5 

DAYS 


Fenofibrate Nanocrystallized 145 Mg Tablet, 145 MG PO DAILY, (Reported)


   FILLED 09- #10/10 DAY SUPPLY 


Fluticasone Propionate 16 Gm Spray.susp, 2 SPRAYS NSEACH DAILY, (Reported)


Ipratropium/Albuterol Sulfate 3 Ml Ampul.neb, 3 ML IH TID PRN for SHORTNESS OF 

BREATH, (Reported)


Ivermectin 3 Mg Tablet, 6 MG PO BID, (Reported)


   TAKES 2 (3MG) TABS FILLED 09- #20/5 DAY SUPPLY 


Loratadine 10 Mg Tablet, 10 MG PO DAILY, (Reported)


Ondansetron 4 Mg Tab.rapdis, 4 MG PO Q6H PRN for NAUSEA/VOMITING-1ST LINE, 

(Reported)


Zinc 50 Mg Tablet, 50 MG PO DAILY, (Reported)





Past Medical/Social/Family Hx


Patient Social History


Marrital Status:  


Employed/Student:  employed ([employer] 12 years)


Tobacco Use?:  No


Smoking Status:  Never a Smoker


Smokeless type used:  Chew


Smokeless Tobacco Frequency:  Current Everyday User


Use of E-Cig and/or Vaping dev:  No


E-Cig and/or Vaping Freq:  Never a User


Substance use?:  No


Alcohol Use?:  Yes


Alcohol type:  Beer


Alcohol Frequency:  Once in a while


Pt stated abuse/neglect:  No





Immunizations Up To Date


Influenza Vaccine Up-to-Date:  No; Not Current


Tetanus Booster (TDap):  Unknown


Hepatitis A:  No


Hepatitis B:  No


TB Skin Test:  None





Current Status


Advance Directives:  No


Communicates:  Verbally


Primary Language:  English


Preferred Spoken Language:  English


Is interpretation needed?:  No





Review of Systems


Constitutional:  see HPI


EENTM:  see HPI


Respiratory:  see HPI


Cardiovascular:  see HPI


Gastrointestinal:  see HPI


Genitourinary:  see HPI


Musculoskeletal:  see HPI


Skin:  see HPI


Psychiatric/Neurological:  See HPI





Sepsis Event


Evaluation


Height, Weight, BMI


Height: '"


Weight: lbs. oz. kg; 40.35 BMI


Method:





Exam


Exam


Patient acknowledged, consented, and participated in this virtual visit which 

was conducted using real time audio/video


Vital Signs








  Date Time  Temp Pulse Resp B/P (MAP) Pulse Ox O2 Delivery O2 Flow Rate FiO2


 


9/23/21 10:56       35.00 85


 


9/23/21 10:54     91 Vapotherm 35.00 80


 


9/23/21 08:00     92 Vapotherm 35.00 80


 


9/23/21 07:22     92 Vapotherm 35.00 80


 


9/23/21 07:17     94 Vapotherm 35.00 80


 


9/23/21 06:42  75      


 


9/23/21 04:16     92   80


 


9/23/21 03:07 37.2 85 22 140/81 (100) 93 Vapotherm 35.00 





       80.00 


 


9/23/21 02:29     91 Vapotherm 35.00 55


 


9/23/21 00:56  86      


 


9/23/21 00:05     94 Vapotherm 30.00 55


 


9/23/21 00:00 36.4 86 22 134/80 (98) 91 High Flow N/C 7.00 


 


9/22/21 19:50     91 High Flow N/C 7.00 


 


9/22/21 19:40     92 Nasal Cannula 5.00 


 


9/22/21 19:36 36.4 82 20 148/90 (109) 92 High Flow N/C 7.00 


 


9/22/21 18:56 37.9 93   96   21


 


9/22/21 18:47     93 High Flow N/C 7.00 


 


9/22/21 17:12 37.2 75 20 166/88 (114) 93 High Flow N/C 7.00 














I & O 


 


 9/23/21





 06:59


 


Intake Total 1150 ml


 


Output Total 800 ml


 


Balance 350 ml








Height & Weight


Height: '"


Weight: lbs. oz. kg; 40.35 BMI


Method:


General Appearance:  No Apparent Distress, WD/WN, Mild Distress, Obese


HEENT:  PERRL/EOMI, Normal ENT Inspection, Pharynx Normal, Moist Mucous 

Membranes


Neck:  Full Range of Motion, Normal Inspection, Non Tender


Respiratory:  Chest Non Tender, Normal Breath Sounds, No Accessory Muscle Use, 

No Respiratory Distress, Crackles


Cardiovascular:  Regular Rate, Rhythm, No Edema, No Gallop, No JVD, No Murmur, 

Normal Peripheral Pulses


Extremity:  Normal Capillary Refill, Normal Inspection, Normal Range of Motion, 

Non Tender, No Calf Tenderness, No Pedal Edema


Neurologic/Psychiatric:  Alert, Oriented x3, No Motor/Sensory Deficits, Normal 

Mood/Affect


Skin:  Normal Color, Warm/Dry


Lymphatic:  No Adenopathy





Results


Lab


Laboratory Tests


9/22/21 19:45








9/23/21 06:51








9/23/21 07:55











Assessment/Plan


Assessment/Plan


Fairly extensive opacities on CXR which is consistent with his need for high 

flow oxygen. Would continue Decadron, to get IV Actemra, Need to closely monitor

oxygen needs. 


Would repeat CXR in am, Pt/wife want Vitamins, I would give 2000U/d Vit D po


Time spent with patient (mins):  30











COREY HYDE MD             Sep 23, 2021 11:14

## 2021-09-23 NOTE — PROGRESS NOTE - HOSPITALIST
CUSHING,JACOB M 9/23/21 1457:


Subjective


HPI/CC On Admission


Date Seen by Provider:  Sep 23, 2021


Time Seen by Provider:  11:15


CC: SOB from Covid-19 pneumonia 





HPI: This is a 25yoWM who is presenting [place] Regeneron [place] [facility] [place] 

to be [place] and SOB, Pt was assessed in the ER found to have hypoxia and 

ABG/PO2 of 50. He required 7 liters of oxygen, his BMI is 40 and since he is at 

high risk for intubation he will be moved to cardiac step-down to be closer to 

pulmonary critical care. 





To note he reports he is not diabetic but he is having hyperglycemia with the 

steroid administration in addition he appears to have sleep apnea undiagnosed 

but he is a lifetime non-smoker.


Subjective/Events-last exam


Today Mr. Reynolds reports that his dyspnea. During conversation patient had 

difficulty breathing between sentences and his face became flushed. Reports that

he is feeling the same as yesterday overall. 


Endorses pleuritic chest pain, dyspnea, cough. Denies fevers, chills, nausea, 

vomiting, diarrhea, constipation.





Objective


Exam


Vital Signs





Vital Signs








  Date Time  Temp Pulse Resp B/P (MAP) Pulse Ox O2 Delivery O2 Flow Rate FiO2


 


9/23/21 14:24     93 Vapotherm 30.00 90


 


9/23/21 12:24  88      


 


9/23/21 12:15   27     


 


9/23/21 08:00 37.2   145/88 (107)    





Capillary Refill :


General Appearance:  Anxious, Mild Distress


Respiratory:  No Accessory Muscle Use, No Respiratory Distress, Decreased Breath

Sounds


Cardiovascular:  Regular Rate, Rhythm, No Edema, No Murmur, Normal Peripheral 

Pulses


Gastrointestinal:  Normal Bowel Sounds


Extremity:  Normal Capillary Refill, No Calf Tenderness, No Pedal Edema


Neurologic/Psychiatric:  Alert, Oriented x3


Skin:  Erythema (Facial flushing)





Results/Procedures


Lab


Laboratory Tests


9/22/21 19:45








9/23/21 06:51








9/23/21 07:55








Patient resulted labs reviewed.





Assessment/Plan


Assessment and Plan


Assess & Plan/Chief Complaint


Elijah Reynolds is a 26yo male with past medical history of obesity and possible 

VERNA who was admitted 9/22 for management of COVID-19 pneumonia


Current Problems are the following





Acute hypoxic respiratory failure 2/2 COVID


-9/17: Tested positive for COVID following exposure 9/10


-9/22: Admission for worsening dyspnea. Required 7L NC


-9/23: Transitioned from NC to vapotherm


   -Current vapotherm settings: FIO2 80, O2 flow 35


Plan


-Begin tocilizumab injections


-Continue dexamethasone 6mg 


-Continue Lovenox 40mg


-Continue oxygen supplementation. 


-Risk for intubation is high.





ELLEN BOOKER DO 9/24/21 0551:


Subjective


Subjective/Events-last exam


Patient appears to be fatiguing


Transferring to the ICU


Maxed on Vapotherm


May need BiPAP


Review of Systems


General:  Fatigue


Pulmonary:  Dyspnea





Objective


Exam


General Appearance:  WD/WN, Anxious, Chronically ill, Mild Distress, Obese


Respiratory:  Accessory Muscle Use, Decreased Breath Sounds


Cardiovascular:  Regular Rate, Rhythm


Neurologic/Psychiatric:  Alert, Oriented x3





Assessment/Plan


Assessment and Plan


Assess & Plan/Chief Complaint


ICU transfer


Maxed on Vapotherm


May need BiPAP


High risk for intubation


High risk for death within 2 weeks





Supervisory-Addendum Brief


Verification & Attestation


Participated in pt care:  history, MDM, physical


Personally performed:  exam, history, MDM, supervision of care


Care discussed with:  Medical Student


Procedures:  n/a


Results interpretation:  Verified all documentation


Verification and Attestation of Medical Student E/M Service





A medical student performed and documented this service in my presence. I 

reviewed and verified all information documented by the medical student and made

modifications to such information, when appropriate. I personally performed the 

physical exam and medical decision making. 





 Ellen Booker, Sep 24, 2021,05:50











CUSHING,JACOB M                Sep 23, 2021 14:57


ELLEN BOOKER DO                Sep 24, 2021 05:51

## 2021-09-24 VITALS — DIASTOLIC BLOOD PRESSURE: 81 MMHG | SYSTOLIC BLOOD PRESSURE: 144 MMHG

## 2021-09-24 VITALS — SYSTOLIC BLOOD PRESSURE: 142 MMHG | DIASTOLIC BLOOD PRESSURE: 90 MMHG

## 2021-09-24 VITALS — SYSTOLIC BLOOD PRESSURE: 101 MMHG | DIASTOLIC BLOOD PRESSURE: 91 MMHG

## 2021-09-24 VITALS — DIASTOLIC BLOOD PRESSURE: 69 MMHG | SYSTOLIC BLOOD PRESSURE: 120 MMHG

## 2021-09-24 VITALS — DIASTOLIC BLOOD PRESSURE: 90 MMHG | SYSTOLIC BLOOD PRESSURE: 139 MMHG

## 2021-09-24 VITALS — SYSTOLIC BLOOD PRESSURE: 120 MMHG | DIASTOLIC BLOOD PRESSURE: 69 MMHG

## 2021-09-24 VITALS — SYSTOLIC BLOOD PRESSURE: 129 MMHG | DIASTOLIC BLOOD PRESSURE: 79 MMHG

## 2021-09-24 VITALS — DIASTOLIC BLOOD PRESSURE: 88 MMHG | SYSTOLIC BLOOD PRESSURE: 151 MMHG

## 2021-09-24 VITALS — DIASTOLIC BLOOD PRESSURE: 96 MMHG | SYSTOLIC BLOOD PRESSURE: 135 MMHG

## 2021-09-24 VITALS — SYSTOLIC BLOOD PRESSURE: 139 MMHG | DIASTOLIC BLOOD PRESSURE: 90 MMHG

## 2021-09-24 VITALS — DIASTOLIC BLOOD PRESSURE: 81 MMHG | SYSTOLIC BLOOD PRESSURE: 136 MMHG

## 2021-09-24 VITALS — DIASTOLIC BLOOD PRESSURE: 82 MMHG | SYSTOLIC BLOOD PRESSURE: 128 MMHG

## 2021-09-24 VITALS — SYSTOLIC BLOOD PRESSURE: 142 MMHG | DIASTOLIC BLOOD PRESSURE: 86 MMHG

## 2021-09-24 VITALS — SYSTOLIC BLOOD PRESSURE: 141 MMHG | DIASTOLIC BLOOD PRESSURE: 89 MMHG

## 2021-09-24 VITALS — SYSTOLIC BLOOD PRESSURE: 103 MMHG | DIASTOLIC BLOOD PRESSURE: 89 MMHG

## 2021-09-24 VITALS — SYSTOLIC BLOOD PRESSURE: 141 MMHG | DIASTOLIC BLOOD PRESSURE: 86 MMHG

## 2021-09-24 VITALS — SYSTOLIC BLOOD PRESSURE: 140 MMHG | DIASTOLIC BLOOD PRESSURE: 85 MMHG

## 2021-09-24 VITALS — DIASTOLIC BLOOD PRESSURE: 85 MMHG | SYSTOLIC BLOOD PRESSURE: 137 MMHG

## 2021-09-24 VITALS — SYSTOLIC BLOOD PRESSURE: 141 MMHG | DIASTOLIC BLOOD PRESSURE: 97 MMHG

## 2021-09-24 VITALS — DIASTOLIC BLOOD PRESSURE: 91 MMHG | SYSTOLIC BLOOD PRESSURE: 138 MMHG

## 2021-09-24 VITALS — SYSTOLIC BLOOD PRESSURE: 140 MMHG | DIASTOLIC BLOOD PRESSURE: 93 MMHG

## 2021-09-24 VITALS — DIASTOLIC BLOOD PRESSURE: 75 MMHG | SYSTOLIC BLOOD PRESSURE: 133 MMHG

## 2021-09-24 VITALS — SYSTOLIC BLOOD PRESSURE: 122 MMHG | DIASTOLIC BLOOD PRESSURE: 83 MMHG

## 2021-09-24 VITALS — DIASTOLIC BLOOD PRESSURE: 66 MMHG | SYSTOLIC BLOOD PRESSURE: 125 MMHG

## 2021-09-24 VITALS — SYSTOLIC BLOOD PRESSURE: 138 MMHG | DIASTOLIC BLOOD PRESSURE: 100 MMHG

## 2021-09-24 LAB
BASOPHILS # BLD AUTO: 0 10^3/UL (ref 0–0.1)
BASOPHILS NFR BLD AUTO: 0 % (ref 0–10)
BUN/CREAT SERPL: 19
CALCIUM SERPL-MCNC: 9 MG/DL (ref 8.5–10.1)
CHLORIDE SERPL-SCNC: 106 MMOL/L (ref 98–107)
CO2 SERPL-SCNC: 21 MMOL/L (ref 21–32)
CREAT SERPL-MCNC: 0.78 MG/DL (ref 0.6–1.3)
EOSINOPHIL # BLD AUTO: 0 10^3/UL (ref 0–0.3)
EOSINOPHIL NFR BLD AUTO: 0 % (ref 0–10)
GFR SERPLBLD BASED ON 1.73 SQ M-ARVRAT: 119 ML/MIN
GLUCOSE SERPL-MCNC: 100 MG/DL (ref 70–105)
HCT VFR BLD CALC: 46 % (ref 40–54)
HGB BLD-MCNC: 14.8 G/DL (ref 13.3–17.7)
LYMPHOCYTES # BLD AUTO: 1.1 10^3/UL (ref 1–4)
LYMPHOCYTES NFR BLD AUTO: 15 % (ref 12–44)
MAGNESIUM SERPL-MCNC: 2.6 MG/DL (ref 1.6–2.4)
MANUAL DIFFERENTIAL PERFORMED BLD QL: NO
MCH RBC QN AUTO: 28 PG (ref 25–34)
MCHC RBC AUTO-ENTMCNC: 33 G/DL (ref 32–36)
MCV RBC AUTO: 86 FL (ref 80–99)
MONOCYTES # BLD AUTO: 0.3 10^3/UL (ref 0–1)
MONOCYTES NFR BLD AUTO: 4 % (ref 0–12)
NEUTROPHILS # BLD AUTO: 6.1 10^3/UL (ref 1.8–7.8)
NEUTROPHILS NFR BLD AUTO: 80 % (ref 42–75)
PHOSPHATE SERPL-MCNC: 3.9 MG/DL (ref 2.3–4.7)
PLATELET # BLD: 240 10^3/UL (ref 130–400)
PMV BLD AUTO: 10.3 FL (ref 9–12.2)
POTASSIUM SERPL-SCNC: 4.5 MMOL/L (ref 3.6–5)
SODIUM SERPL-SCNC: 140 MMOL/L (ref 135–145)
WBC # BLD AUTO: 7.6 10^3/UL (ref 4.3–11)

## 2021-09-24 RX ADMIN — ENOXAPARIN SODIUM SCH MG: 100 INJECTION SUBCUTANEOUS at 17:20

## 2021-09-24 RX ADMIN — ALBUTEROL SULFATE SCH PUFF: 90 AEROSOL, METERED RESPIRATORY (INHALATION) at 10:00

## 2021-09-24 RX ADMIN — ALBUTEROL SULFATE SCH PUFF: 90 AEROSOL, METERED RESPIRATORY (INHALATION) at 18:44

## 2021-09-24 RX ADMIN — ALBUTEROL SULFATE SCH PUFF: 90 AEROSOL, METERED RESPIRATORY (INHALATION) at 15:34

## 2021-09-24 RX ADMIN — DOCUSATE SODIUM AND SENNOSIDES SCH EA: 8.6; 5 TABLET, FILM COATED ORAL at 21:51

## 2021-09-24 RX ADMIN — ALBUTEROL SULFATE SCH PUFF: 90 AEROSOL, METERED RESPIRATORY (INHALATION) at 07:35

## 2021-09-24 RX ADMIN — MELATONIN 3 MG ORAL TABLET SCH MG: 3 TABLET ORAL at 20:59

## 2021-09-24 RX ADMIN — POLYETHYLENE GLYCOL (3350) SCH GM: 17 POWDER, FOR SOLUTION ORAL at 21:51

## 2021-09-24 RX ADMIN — ACETAMINOPHEN PRN MG: 325 TABLET ORAL at 21:00

## 2021-09-24 RX ADMIN — GUAIFENESIN SCH MG: 600 TABLET, EXTENDED RELEASE ORAL at 08:59

## 2021-09-24 RX ADMIN — POLYETHYLENE GLYCOL (3350) SCH GM: 17 POWDER, FOR SOLUTION ORAL at 08:59

## 2021-09-24 RX ADMIN — DOCUSATE SODIUM AND SENNOSIDES SCH EA: 8.6; 5 TABLET, FILM COATED ORAL at 08:59

## 2021-09-24 RX ADMIN — ALBUTEROL SULFATE SCH PUFF: 90 AEROSOL, METERED RESPIRATORY (INHALATION) at 22:20

## 2021-09-24 RX ADMIN — GUAIFENESIN AND CODEINE PHOSPHATE PRN ML: 100; 10 SOLUTION ORAL at 21:00

## 2021-09-24 RX ADMIN — INSULIN ASPART SCH UNIT: 100 INJECTION, SOLUTION INTRAVENOUS; SUBCUTANEOUS at 21:51

## 2021-09-24 RX ADMIN — FAMOTIDINE SCH MG: 20 TABLET, FILM COATED ORAL at 20:59

## 2021-09-24 RX ADMIN — GUAIFENESIN AND CODEINE PHOSPHATE PRN ML: 100; 10 SOLUTION ORAL at 17:20

## 2021-09-24 RX ADMIN — INSULIN ASPART SCH UNIT: 100 INJECTION, SOLUTION INTRAVENOUS; SUBCUTANEOUS at 07:06

## 2021-09-24 RX ADMIN — GUAIFENESIN AND CODEINE PHOSPHATE PRN ML: 100; 10 SOLUTION ORAL at 02:16

## 2021-09-24 RX ADMIN — ALBUTEROL SULFATE SCH PUFF: 90 AEROSOL, METERED RESPIRATORY (INHALATION) at 02:28

## 2021-09-24 RX ADMIN — GUAIFENESIN SCH MG: 600 TABLET, EXTENDED RELEASE ORAL at 20:59

## 2021-09-24 RX ADMIN — INSULIN ASPART SCH UNIT: 100 INJECTION, SOLUTION INTRAVENOUS; SUBCUTANEOUS at 17:57

## 2021-09-24 RX ADMIN — FAMOTIDINE SCH MG: 20 TABLET, FILM COATED ORAL at 08:59

## 2021-09-24 RX ADMIN — FLUTICASONE PROPIONATE AND SALMETEROL SCH EACH: 113; 14 POWDER, METERED RESPIRATORY (INHALATION) at 07:36

## 2021-09-24 NOTE — PROGRESS NOTE - HOSPITALIST
CUSHING,JACOB M 9/24/21 1441:


Subjective


HPI/CC On Admission


Date Seen by Provider:  Sep 24, 2021


Time Seen by Provider:  11:00


CC: SOB from Covid-19 pneumonia 





HPI: This is a 25yoWM who is presenting for the Regeneron infusion [place] [facility] [place] found 

to be hypoxic and SOB, Pt was assessed in the ER found to have hypoxia and 

ABG/PO2 of 50. He required 7 liters of oxygen, his BMI is 40 and since he is at 

high risk for intubation he will be moved to cardiac step-down to be closer to 

pulmonary critical care. 





To note he reports he is not diabetic but he is having hyperglycemia with the 

steroid administration in addition he appears to have sleep apnea undiagnosed 

but he is a lifetime non-smoker.


Subjective/Events-last exam


Today Mr. Reynolds believes that he feels better. He has no questions nor 

concerns. He appears fatigued and in resp distress. Denies fevers, chills, 

nausea, vomiting, diarrhea, constipation.


Unfortunately he was maxed on vapotherm (FIO2 100, O2 flow 40) and will likely 

transition to BIPAP





Objective


Exam


Vital Signs





Vital Signs








  Date Time  Temp Pulse Resp B/P (MAP) Pulse Ox O2 Delivery O2 Flow Rate FiO2


 


9/24/21 13:00  68 25 141/97 (112) 94 Vapotherm 40.00 





       100.00 


 


9/24/21 12:00 36.9       


 


9/24/21 09:56        100





Capillary Refill :


General Appearance:  No Apparent Distress, WD/WN


Respiratory:  Chest Non Tender, Accessory Muscle Use, Decreased Breath Sounds, 

Respiratory Distress


Cardiovascular:  Regular Rate, Rhythm, No Edema, No Murmur, Normal Peripheral 

Pulses


Gastrointestinal:  Normal Bowel Sounds


Extremity:  Normal Capillary Refill, No Calf Tenderness


Neurologic/Psychiatric:  Alert, Oriented x3


Skin:  Normal Color, Warm/Dry





Results/Procedures


Lab


Laboratory Tests


9/24/21 07:06








Patient resulted labs reviewed.





Assessment/Plan


Assessment and Plan


Assess & Plan/Chief Complaint


Elijah Reynolds is a 24yo male with past medical history of obesity and possible 

VERNA who was admitted 9/22 for management of COVID-19 pneumonia


Current Problems are the following





Acute hypoxic respiratory failure 2/2 COVID


-9/17: Tested positive for COVID following exposure 9/10


-9/22: Admission for worsening dyspnea. Required 7L NC


-9/24: Current vapotherm settings: FIO2 100, O2 flow 40


Plan


-Begin tocilizumab injections


-Continue dexamethasone 6mg 


-Continue Lovenox 40mg


-Continue oxygen supplementation. 


-Risk for intubation is high.


Critical Care:  Critically Ill Patient





ELLEN BOOKER DO 9/25/21 0643:


Supervisory-Addendum Brief


Verification & Attestation


Participated in pt care:  history, MDM, physical


Personally performed:  exam, history, MDM, supervision of care


Care discussed with:  Medical Student


Procedures:  n/a


Results interpretation:  Verified all documentation


Verification and Attestation of Medical Student E/M Service





A medical student performed and documented this service in my presence. I 

reviewed and verified all information documented by the medical student and made

modifications to such information, when appropriate. I personally performed the 

physical exam and medical decision making. 





 Ellen Booker, Sep 25, 2021,06:43











CUSHING,JACOB M                Sep 24, 2021 14:41


ELLEN BOOKER DO                Sep 25, 2021 06:43

## 2021-09-24 NOTE — PULMONARY PROGRESS NOTE
LIU BETANCUR MED STUDENT 9/24/21 1111:


Subjective


Date Seen by a Provider:  Sep 24, 2021


Time Seen by a Provider:  07:35


Subjective/Events-last exam


Elijah Reynolds reported his symptoms began with right upper dental pain on 9/12. 

He had a tooth pulled 9/13 and the dentist gave him antibiotics due to suspected

sinus infection. Dental pain resolved but congestion and URI symptoms persisted 

until he was notified of a prior covid exposure and tested positive 9/17. He 

states he has mild congestion but complains of worsening SOB. Currently on 

vapotherm 40L + 100% FiO2. Per night RN, he desaturates to low 80's with any 

movement or lying on his back. Elijah denied prior lung disease, states he rarely

went to a doctor prior to this. He says his cough is nonproductive. No diarrhea,

constipation, fever, chills, or leg pain.





Sepsis Event


Evaluation


Height, Weight, BMI


Height: '"


Weight: lbs. oz. kg; 40.35 BMI


Method:





Exam


Exam


Patient acknowledged, consented, and participated in this virtual visit which 

was conducted using real time audio/video


Vital Signs








  Date Time  Temp Pulse Resp B/P (MAP) Pulse Ox O2 Delivery O2 Flow Rate FiO2


 


9/24/21 10:00  86 23 144/81 (102) 89 Vapotherm 40.00 





       100.00 


 


9/24/21 09:56     92 Vapotherm 40.00 100


 


9/24/21 09:00  66 23 122/83 (96) 90 Vapotherm 40.00 





       100.00 


 


9/24/21 08:00  69 19 103/89 (95) 88 Vapotherm 40.00 





       100.00 


 


9/24/21 07:37     90 Vapotherm 40.00 100


 


9/24/21 07:36     94 Vapotherm 40.00 100


 


9/24/21 07:00  60      


 


9/24/21 07:00  71 11 128/82 (100) 89 Vapotherm 40.00 





       100.00 


 


9/24/21 06:00  58 20 140/93 (109) 93 Vapotherm 40.00 





       100.00 


 


9/24/21 05:00  66 20 129/79 (96) 90 Vapotherm 40.00 





       100.00 


 


9/24/21 04:00  62 21 125/66 (85) 92 Vapotherm 40.00 





       100.00 


 


9/24/21 03:00  56 22 142/90 (107) 89 Vapotherm 40.00 





       100.00 


 


9/24/21 02:28     94 Vapotherm 40.00 100


 


9/24/21 02:00  56 22 142/86 (104) 91 Vapotherm 40.00 





       100.00 


 


9/24/21 01:00  56      


 


9/24/21 01:00  56 22 141/89 (106) 93 Vapotherm 40.00 





       100.00 


 


9/24/21 00:00  70 22 140/85 (103) 90 Vapotherm 40.00 





       100.00 


 


9/23/21 23:00  62 16 139/65 (89) 92 Vapotherm 40.00 





       100.00 


 


9/23/21 22:13     94 Vapotherm 30.00 100


 


9/23/21 22:00  59 20 121/72 (88) 94 Vapotherm 40.00 





       100.00 


 


9/23/21 21:00  71 20 136/78 (97) 92 Vapotherm 40.00 





       100.00 


 


9/23/21 20:00     90 Vapotherm 40.00 100


 


9/23/21 20:00  75 16 123/57 (79) 96 Vapotherm 40.00 





       100.00 


 


9/23/21 19:37 36.0       


 


9/23/21 19:00  70 17 131/78 (95) 95 Vapotherm 40.00 





       100.00 


 


9/23/21 19:00      Vapotherm 40.00 





       100.00 


 


9/23/21 19:00  70      


 


9/23/21 18:44     96 Vapotherm 30.00 100


 


9/23/21 18:41     96 Vapotherm 30.00 100


 


9/23/21 18:00    140/79 (99) 97 Vapotherm 40.00 





       90.00 


 


9/23/21 17:00  74  136/93 (107) 91 Vapotherm 40.00 





       90.00 


 


9/23/21 16:00    139/86 (103)  Vapotherm 40.00 





       90.00 


 


9/23/21 14:24     93 Vapotherm 30.00 90


 


9/23/21 12:24  88      


 


9/23/21 12:15  87 27  89 Vapotherm 30.00 





       90.00 


 


9/23/21 12:07     94  30.00 90


 


9/23/21 12:00  90 24  87 Vapotherm 30.00 





       85.00 











l


I & O 


 


 9/24/21





 07:00


 


Intake Total 850 ml


 


Output Total 725 ml


 


Balance 125 ml








Height & Weight


Height: '"


Weight: lbs. oz. kg; 40.35 BMI


Method:


General Appearance:  WD/WN, Mild Distress, Obese


HEENT:  PERRL/EOMI, Normal ENT Inspection, Pharynx Normal, Moist Mucous 

Membranes; No Pharyngeal Erythema; Other (no visible intraoral abscesses or 

drainage. )


Neck:  Full Range of Motion, Normal Inspection, Non Tender


Respiratory:  Accessory Muscle Use, Decreased Breath Sounds


Cardiovascular:  Regular Rate, Rhythm, Normal Peripheral Pulses


Extremity:  Normal Capillary Refill, No Calf Tenderness, No Pedal Edema


Neurologic/Psychiatric:  Alert, Oriented x3


Skin:  Normal Color, Warm/Dry


Lymphatic:  No Adenopathy





Results


Lab


Laboratory Tests


9/22/21 19:45








9/23/21 06:51








9/23/21 07:55








9/24/21 07:06











Assessment/Plan


Assessment/Plan


Covid 19


acute hypoxic respiratory failure/ARDS


-received tocalizumab


-on Decadron 


-maxed out on vapotherm. Positional hypoxia with this. Will likely need bipap


-continue breathing treatments


-monitor for s/sx covid PNA. Currently Afebrile and normotensive without 

productive cough. 





sinus congestion


-mucinex





minimal transaminitis


-repeat LFTs





mild hypermagnesemia


-continue to monitor





obesity





GI prophylaxis


-pepcid 





DVT prophylaxis


-lovenox 40





IRMA JOHNSON MD 9/24/21 1538:


Supervisory-Addendum Brief


Verification & Attestation


Participated in pt care:  history, MDM, physical


Personally performed:  history, MDM, supervision of care


Care discussed with:  Medical Student


Procedures:  n/a


PLAN as above  





 





A medical student performed and documented this service. I reviewed  all 

information documented by the medical student and made modifications to such 

information, when appropriate.  





Medical student performed patients physical exam. Medical decision making was 

done during  tele-rounds  with this medical student and a bedside RN .  





Plans in collaboration with bedside consultants and IM MDs. 





Discussed with RN to reach out if any questions or concerns 





A total of 25 minutes of critical care time was devoted to this patient today, 

required to treat and/or prevent further deterioration of critical care 

condition ( as above) .











LIU BETANCUR MED STUDENT      Sep 24, 2021 11:11


IRMA JOHNSON MD         Sep 24, 2021 15:38

## 2021-09-24 NOTE — PROGRESS NOTE - HOSPITALIST
Subjective


HPI/CC On Admission


Date Seen by Provider:  Sep 24, 2021


Time Seen by Provider:  11:00


CC: SOB from Covid-19 pneumonia 





HPI: This is a 25yoWM who is presenting for the Regeneron [place] [facility] [place] 

to be hypoxic and SOB, Pt was assessed in the ER found to have hypoxia and 

ABG/PO2 of 50. He required 7 liters of oxygen, his BMI is 40 and since he is at 

high risk for intubation he will be moved to cardiac step-down to be closer to 

pulmonary critical care. 





To note he reports he is not diabetic but he is having hyperglycemia with the 

steroid administration in addition he appears to have sleep apnea undiagnosed 

but he is a lifetime non-smoker.


Subjective/Events-last exam


Patient maxed on Vapotherm


BiPAP will be tried


Reached out to [facility] and even though his BMI is 40 his age of 25 does meet criteria

for ECMO but he must be intubated and fail proning in order to have that 

opportunity


Spoke with eICU specialist and if he requires intubation we will update [facility] and 

possibly move towards that


Updated wife as requested.  Stated that he will have a challenge and possibly 

ICU stay for weeks.  She then became very irate and asked for him to be 

transferred.  I told her Covid treatments were managed on a standard of care and

no other hospital would be doing anything differently.  Wife has called the 

nurse asking for ivermectin and multiple other supplements and non-FDA approved 

treatment after she is talked to some healthcare professionals outside of the 

hospital.  Tried to reassure but wife seems to be unreasonable.





Review of Systems


Pulmonary:  Dyspnea





Objective


Exam


Vital Signs





Vital Signs








  Date Time  Temp Pulse Resp B/P (MAP) Pulse Ox O2 Delivery O2 Flow Rate FiO2


 


9/25/21 06:00  58 22 124/77 (93) 92 NIV Bilevel 85.00 


 


9/25/21 00:00 36.0       


 


9/24/21 20:00        100





Capillary Refill :


General Appearance:  Anxious, Chronically ill, Mild Distress, Other (Fatigued)


Respiratory:  Accessory Muscle Use, Decreased Breath Sounds


Cardiovascular:  Regular Rate, Rhythm


Neurologic/Psychiatric:  Alert, Oriented x3





Results/Procedures


Lab


Laboratory Tests


9/24/21 07:06








9/25/21 05:21








Patient resulted labs reviewed.





Assessment/Plan


Assessment and Plan


Assess & Plan/Chief Complaint


ICU status maintained


Maxed on Vapotherm


BiPAP initiated


High risk for intubation


High risk for death within 2 weeks











SUNITA BOOKER DO                Sep 24, 2021 12:38

## 2021-09-25 VITALS — DIASTOLIC BLOOD PRESSURE: 78 MMHG | SYSTOLIC BLOOD PRESSURE: 125 MMHG

## 2021-09-25 VITALS — SYSTOLIC BLOOD PRESSURE: 125 MMHG | DIASTOLIC BLOOD PRESSURE: 69 MMHG

## 2021-09-25 VITALS — DIASTOLIC BLOOD PRESSURE: 76 MMHG | SYSTOLIC BLOOD PRESSURE: 128 MMHG

## 2021-09-25 VITALS — DIASTOLIC BLOOD PRESSURE: 82 MMHG | SYSTOLIC BLOOD PRESSURE: 137 MMHG

## 2021-09-25 VITALS — DIASTOLIC BLOOD PRESSURE: 80 MMHG | SYSTOLIC BLOOD PRESSURE: 115 MMHG

## 2021-09-25 VITALS — SYSTOLIC BLOOD PRESSURE: 118 MMHG | DIASTOLIC BLOOD PRESSURE: 78 MMHG

## 2021-09-25 VITALS — SYSTOLIC BLOOD PRESSURE: 141 MMHG | DIASTOLIC BLOOD PRESSURE: 108 MMHG

## 2021-09-25 VITALS — SYSTOLIC BLOOD PRESSURE: 140 MMHG | DIASTOLIC BLOOD PRESSURE: 95 MMHG

## 2021-09-25 VITALS — SYSTOLIC BLOOD PRESSURE: 115 MMHG | DIASTOLIC BLOOD PRESSURE: 67 MMHG

## 2021-09-25 VITALS — DIASTOLIC BLOOD PRESSURE: 75 MMHG | SYSTOLIC BLOOD PRESSURE: 128 MMHG

## 2021-09-25 VITALS — DIASTOLIC BLOOD PRESSURE: 77 MMHG | SYSTOLIC BLOOD PRESSURE: 124 MMHG

## 2021-09-25 VITALS — SYSTOLIC BLOOD PRESSURE: 128 MMHG | DIASTOLIC BLOOD PRESSURE: 75 MMHG

## 2021-09-25 VITALS — SYSTOLIC BLOOD PRESSURE: 128 MMHG | DIASTOLIC BLOOD PRESSURE: 76 MMHG

## 2021-09-25 VITALS — DIASTOLIC BLOOD PRESSURE: 81 MMHG | SYSTOLIC BLOOD PRESSURE: 127 MMHG

## 2021-09-25 VITALS — DIASTOLIC BLOOD PRESSURE: 86 MMHG | SYSTOLIC BLOOD PRESSURE: 117 MMHG

## 2021-09-25 VITALS — SYSTOLIC BLOOD PRESSURE: 126 MMHG | DIASTOLIC BLOOD PRESSURE: 87 MMHG

## 2021-09-25 VITALS — SYSTOLIC BLOOD PRESSURE: 118 MMHG | DIASTOLIC BLOOD PRESSURE: 72 MMHG

## 2021-09-25 VITALS — DIASTOLIC BLOOD PRESSURE: 74 MMHG | SYSTOLIC BLOOD PRESSURE: 127 MMHG

## 2021-09-25 VITALS — DIASTOLIC BLOOD PRESSURE: 90 MMHG | SYSTOLIC BLOOD PRESSURE: 126 MMHG

## 2021-09-25 VITALS — SYSTOLIC BLOOD PRESSURE: 122 MMHG | DIASTOLIC BLOOD PRESSURE: 74 MMHG

## 2021-09-25 VITALS — SYSTOLIC BLOOD PRESSURE: 124 MMHG | DIASTOLIC BLOOD PRESSURE: 77 MMHG

## 2021-09-25 VITALS — SYSTOLIC BLOOD PRESSURE: 126 MMHG | DIASTOLIC BLOOD PRESSURE: 86 MMHG

## 2021-09-25 VITALS — SYSTOLIC BLOOD PRESSURE: 121 MMHG | DIASTOLIC BLOOD PRESSURE: 68 MMHG

## 2021-09-25 VITALS — DIASTOLIC BLOOD PRESSURE: 73 MMHG | SYSTOLIC BLOOD PRESSURE: 117 MMHG

## 2021-09-25 VITALS — SYSTOLIC BLOOD PRESSURE: 121 MMHG | DIASTOLIC BLOOD PRESSURE: 63 MMHG

## 2021-09-25 LAB
BASOPHILS # BLD AUTO: 0 10^3/UL (ref 0–0.1)
BASOPHILS NFR BLD AUTO: 0 % (ref 0–10)
BUN/CREAT SERPL: 25
CALCIUM SERPL-MCNC: 9 MG/DL (ref 8.5–10.1)
CHLORIDE SERPL-SCNC: 103 MMOL/L (ref 98–107)
CO2 SERPL-SCNC: 24 MMOL/L (ref 21–32)
CREAT SERPL-MCNC: 0.83 MG/DL (ref 0.6–1.3)
EOSINOPHIL # BLD AUTO: 0.1 10^3/UL (ref 0–0.3)
EOSINOPHIL NFR BLD AUTO: 1 % (ref 0–10)
GFR SERPLBLD BASED ON 1.73 SQ M-ARVRAT: 111 ML/MIN
GLUCOSE SERPL-MCNC: 99 MG/DL (ref 70–105)
HCT VFR BLD CALC: 46 % (ref 40–54)
HGB BLD-MCNC: 15.3 G/DL (ref 13.3–17.7)
LYMPHOCYTES # BLD AUTO: 1.4 10^3/UL (ref 1–4)
LYMPHOCYTES NFR BLD AUTO: 21 % (ref 12–44)
MAGNESIUM SERPL-MCNC: 2.4 MG/DL (ref 1.6–2.4)
MANUAL DIFFERENTIAL PERFORMED BLD QL: NO
MCH RBC QN AUTO: 28 PG (ref 25–34)
MCHC RBC AUTO-ENTMCNC: 33 G/DL (ref 32–36)
MCV RBC AUTO: 85 FL (ref 80–99)
MONOCYTES # BLD AUTO: 0.3 10^3/UL (ref 0–1)
MONOCYTES NFR BLD AUTO: 4 % (ref 0–12)
NEUTROPHILS # BLD AUTO: 4.8 10^3/UL (ref 1.8–7.8)
NEUTROPHILS NFR BLD AUTO: 73 % (ref 42–75)
PHOSPHATE SERPL-MCNC: 4.4 MG/DL (ref 2.3–4.7)
PLATELET # BLD: 291 10^3/UL (ref 130–400)
PMV BLD AUTO: 9.8 FL (ref 9–12.2)
POTASSIUM SERPL-SCNC: 4.1 MMOL/L (ref 3.6–5)
SODIUM SERPL-SCNC: 140 MMOL/L (ref 135–145)
WBC # BLD AUTO: 6.6 10^3/UL (ref 4.3–11)

## 2021-09-25 RX ADMIN — POTASSIUM CHLORIDE SCH MLS/HR: 200 INJECTION, SOLUTION INTRAVENOUS at 05:01

## 2021-09-25 RX ADMIN — FAMOTIDINE SCH MG: 20 TABLET, FILM COATED ORAL at 08:13

## 2021-09-25 RX ADMIN — ALBUTEROL SULFATE SCH PUFF: 90 AEROSOL, METERED RESPIRATORY (INHALATION) at 10:13

## 2021-09-25 RX ADMIN — INSULIN ASPART SCH UNIT: 100 INJECTION, SOLUTION INTRAVENOUS; SUBCUTANEOUS at 16:35

## 2021-09-25 RX ADMIN — POTASSIUM CHLORIDE SCH MEQ: 1500 TABLET, EXTENDED RELEASE ORAL at 05:01

## 2021-09-25 RX ADMIN — ALBUTEROL SULFATE SCH PUFF: 90 AEROSOL, METERED RESPIRATORY (INHALATION) at 22:14

## 2021-09-25 RX ADMIN — ALBUTEROL SULFATE SCH PUFF: 90 AEROSOL, METERED RESPIRATORY (INHALATION) at 15:35

## 2021-09-25 RX ADMIN — INSULIN ASPART SCH UNIT: 100 INJECTION, SOLUTION INTRAVENOUS; SUBCUTANEOUS at 06:28

## 2021-09-25 RX ADMIN — GUAIFENESIN SCH MG: 600 TABLET, EXTENDED RELEASE ORAL at 20:37

## 2021-09-25 RX ADMIN — FAMOTIDINE SCH MG: 20 TABLET, FILM COATED ORAL at 20:37

## 2021-09-25 RX ADMIN — FLUTICASONE PROPIONATE AND SALMETEROL SCH EACH: 113; 14 POWDER, METERED RESPIRATORY (INHALATION) at 10:12

## 2021-09-25 RX ADMIN — INSULIN ASPART SCH UNIT: 100 INJECTION, SOLUTION INTRAVENOUS; SUBCUTANEOUS at 13:55

## 2021-09-25 RX ADMIN — DOCUSATE SODIUM AND SENNOSIDES SCH EA: 8.6; 5 TABLET, FILM COATED ORAL at 08:13

## 2021-09-25 RX ADMIN — ALBUTEROL SULFATE SCH PUFF: 90 AEROSOL, METERED RESPIRATORY (INHALATION) at 18:29

## 2021-09-25 RX ADMIN — ALBUTEROL SULFATE SCH PUFF: 90 AEROSOL, METERED RESPIRATORY (INHALATION) at 02:58

## 2021-09-25 RX ADMIN — INSULIN ASPART SCH UNIT: 100 INJECTION, SOLUTION INTRAVENOUS; SUBCUTANEOUS at 20:38

## 2021-09-25 RX ADMIN — GUAIFENESIN SCH MG: 600 TABLET, EXTENDED RELEASE ORAL at 08:13

## 2021-09-25 RX ADMIN — DOCUSATE SODIUM AND SENNOSIDES SCH EA: 8.6; 5 TABLET, FILM COATED ORAL at 20:37

## 2021-09-25 RX ADMIN — FLUTICASONE PROPIONATE AND SALMETEROL SCH EACH: 113; 14 POWDER, METERED RESPIRATORY (INHALATION) at 15:35

## 2021-09-25 RX ADMIN — ENOXAPARIN SODIUM SCH MG: 100 INJECTION SUBCUTANEOUS at 17:35

## 2021-09-25 RX ADMIN — POLYETHYLENE GLYCOL (3350) SCH GM: 17 POWDER, FOR SOLUTION ORAL at 08:13

## 2021-09-25 RX ADMIN — MELATONIN 3 MG ORAL TABLET SCH MG: 3 TABLET ORAL at 20:37

## 2021-09-25 RX ADMIN — POLYETHYLENE GLYCOL (3350) SCH GM: 17 POWDER, FOR SOLUTION ORAL at 20:37

## 2021-09-25 RX ADMIN — ALBUTEROL SULFATE SCH PUFF: 90 AEROSOL, METERED RESPIRATORY (INHALATION) at 07:12

## 2021-09-25 RX ADMIN — MAGNESIUM SULFATE IN DEXTROSE SCH MLS/HR: 10 INJECTION, SOLUTION INTRAVENOUS at 05:01

## 2021-09-25 RX ADMIN — DIPHENHYDRAMINE HCL PRN MG: 25 TABLET ORAL at 20:37

## 2021-09-25 NOTE — TELE-ICU PROGRESS NOTE
Subjective


Date Seen by a Provider:  Sep 25, 2021


Time Seen by a Provider:  08:22


Subjective/Events-last exam


This virtual visit was conducted using real time audio/video. 


Thank you for asking us to see this patient for respiratory insufficiency and 

distress due to Covid pna, possible undiagnosed VERNA. 


[facility]: Recent events: Not accepted at [facility] as not on vent.


PE: Obese, comfortable on Bi PAP. VSS.  O2 sat 95% on BiPAP 80% 16/10.


HEENT: No obvious masses, adenopathy or JVD. 


Chest: clear to auscultation. 


CV: RRR S1 S2 No murmur or added sounds. 


Abd: Non-tender. Bowel sounds Y. 


: Unremarkable. Rodriguez N. 


CNS/psychiatric: Alert and oriented, grossly intact. No obvious focal findings. 


Extremities: No edema. Capillary refill < 3 seconds. 


Skin: unremarkable. 


Results: CXR 9/22 w B infilts.


A/P: Respiratory insufficiency/distress: Cont alb., Airduo


Available chart/ vitals / labs /images reviewed.


Video assessment done using teleICU camera, rest of exam as per RN.


Respiratory: Wean BiPAP as amira.


Monitor for increasing oxygenation needs and/or need for intubation. 


Critical Care: critically ill patient. Cont Pepcid, Dex., Lov., insulin.


Discussed with CRYSTAL Varner. Asked RN to reach out to eICU if any questions or 

concerns later. Time spent with patient/coordination of care with other health 

professionals (mins): 25





Sepsis Event


Evaluation


Height, Weight, BMI


Height: '"


Weight: lbs. oz. kg; 40.35 BMI


Method:





Exam


Exam


Patient acknowledged, consented, and participated in this virtual visit which 

was conducted using real time audio/video


Vital Signs








  Date Time  Temp Pulse Resp B/P (MAP) Pulse Ox O2 Delivery O2 Flow Rate FiO2


 


9/25/21 09:00  55 23 115/80 (92) 95 NIV Bilevel 85.00 


 


9/25/21 08:02 36.0       


 


9/25/21 08:00  53 13 125/78 (94) 91 NIV Bilevel 85.00 


 


9/25/21 07:12  51 31  92  80.00 


 


9/25/21 07:00  55 30 128/75 (92) 95 NIV Bilevel 85.00 


 


9/25/21 07:00  70      


 


9/25/21 06:00  58 22 124/77 (93) 92 NIV Bilevel 85.00 


 


9/25/21 05:00  49 22 127/81 (96) 92 NIV Bilevel 85.00 


 


9/25/21 04:00  58 17 137/82 (100) 92 NIV Bilevel 75.00 


 


9/25/21 03:00  59 22 122/74 (90) 92 NIV Bilevel 75.00 


 


9/25/21 02:58  57 27  93  70.00 


 


9/25/21 02:00  52 22 126/87 (100) 95 NIV Bilevel 75.00 


 


9/25/21 01:00  54      


 


9/25/21 01:00  54 22 124/77 (93) 95 NIV Bilevel 75.00 


 


9/25/21 00:00  59 26 121/68 (85) 93 NIV Bilevel 75.00 


 


9/25/21 00:00 36.0       


 


9/24/21 23:00  64 29 136/81 (99) 94 NIV Bilevel 75.00 


 


9/24/21 22:20  63 31  95  70.00 


 


9/24/21 22:00  61 26 120/69 (86) 96 NIV Bilevel 75.00 


 


9/24/21 21:30      NIV Bilevel 75.00 


 


9/24/21 21:00  72 26 151/88 (109) 88 Vapotherm 40.00 





       100.00 


 


9/24/21 20:05 36.8       


 


9/24/21 20:00  70 20 133/75 (94) 85 Vapotherm 40.00 





       100.00 


 


9/24/21 20:00     89 Vapotherm 40.00 100


 


9/24/21 19:44 36.6 75 24 101/91 (94) 89 Vapotherm 40.00 





       100.00 


 


9/24/21 19:00  81      


 


9/24/21 19:00  81 20 101/91 (94) 88 Vapotherm 40.00 





       100.00 


 


9/24/21 18:44     91 Vapotherm 40.00 100


 


9/24/21 18:00  73 25 137/85 (102) 89 Vapotherm 40.00 





       100.00 


 


9/24/21 17:00  86 16  95 Vapotherm 40.00 





       100.00 


 


9/24/21 16:00  65 15 141/86 (104) 93 Vapotherm 40.00 





       100.00 


 


9/24/21 16:00 36.0       


 


9/24/21 15:35  54 28  96  90.00 


 


9/24/21 15:00  62 28 139/90 (106) 95 Vapotherm 40.00 





       100.00 


 


9/24/21 14:00  59 31 138/91 (107) 90 Vapotherm 40.00 





       100.00 


 


9/24/21 13:00  68 25 141/97 (112) 94 Vapotherm 40.00 





       100.00 


 


9/24/21 12:49  62      


 


9/24/21 12:00  74 17 138/100 (113) 90 Vapotherm 40.00 





       100.00 


 


9/24/21 12:00 36.9       


 


9/24/21 11:00  58 23 135/96 (109) 96 Vapotherm 40.00 





       100.00 


 


9/24/21 10:00  86 23 144/81 (102) 89 Vapotherm 40.00 





       100.00 


 


9/24/21 09:56     92 Vapotherm 40.00 100














I & O 


 


 9/25/21





 07:00


 


Intake Total 950 ml


 


Output Total 1575 ml


 


Balance -625 ml








Height & Weight


Height: '"


Weight: lbs. oz. kg; 40.35 BMI


Method:


General Appearance:  Anxious, Chronically ill, Mild Distress, Other (Fatigued)


HEENT:  PERRL/EOMI, Normal ENT Inspection, Pharynx Normal, Moist Mucous 

Membranes; No Pharyngeal Erythema; Other (no visible intraoral abscesses or 

drainage. )


Neck:  Full Range of Motion, Normal Inspection, Non Tender


Respiratory:  Accessory Muscle Use, Decreased Breath Sounds


Cardiovascular:  Regular Rate, Rhythm


Peripheral Pulses:  1+ Dorsalis Pedis (R), 1+ Left Dors-Pedis (L)


Extremity:  Normal Capillary Refill, No Calf Tenderness


Neurologic/Psychiatric:  Alert, Oriented x3


Skin:  Normal Color, Warm/Dry


Lymphatic:  No Adenopathy





Results


Lab


Laboratory Tests


9/24/21 07:06








9/25/21 05:21











Assessment/Plan


Assessment/Plan


SEe free text.


Critical Care:  Critically Ill Patient











SARAH MEDINA MD          Sep 25, 2021 09:45

## 2021-09-25 NOTE — PROGRESS NOTE - HOSPITALIST
Subjective


HPI/CC On Admission


Date Seen by Provider:  Sep 25, 2021


Time Seen by Provider:  11:30


CC: SOB from Covid-19 pneumonia 





HPI: This is a 25yoWM who is presenting for the Regeneron infusion at [facility] [place] found 

to be hypoxic and SOB, Pt was assessed in the ER found to have hypoxia and 

ABG/PO2 of 50. He required 7 liters of oxygen, his BMI is 40 and since he is at 

high risk for intubation he will be moved to cardiac step-down to be closer to 

pulmonary critical care. 





To note he reports he is not diabetic but he is having hyperglycemia with the 

steroid administration in addition he appears to have sleep apnea undiagnosed 

but he is a lifetime non-smoker.


Subjective/Events-last exam


Patient about the same


No need for intubation currently


BiPAP at 80%


Lying on his right side


Tachypnea at times but not in distress


Not eating well





Review of Systems


Pulmonary:  Dyspnea, Cough





Objective


Exam


Vital Signs





Vital Signs








  Date Time  Temp Pulse Resp B/P (MAP) Pulse Ox O2 Delivery O2 Flow Rate FiO2


 


9/25/21 15:35     95 Vapotherm 40.00 100


 


9/25/21 15:00  68 16 121/63 (82)    


 


9/25/21 11:28 36.6       





Capillary Refill :


General Appearance:  No Apparent Distress, Anxious, Chronically ill


Respiratory:  No Accessory Muscle Use, No Respiratory Distress, Decreased Breath

Sounds


Cardiovascular:  Regular Rate, Rhythm


Neurologic/Psychiatric:  Alert, Oriented x3, No Motor/Sensory Deficits, Normal 

Mood/Affect





Results/Procedures


Lab


Laboratory Tests


9/25/21 05:21








Patient resulted labs reviewed.





Assessment/Plan


Assessment and Plan


Assess & Plan/Chief Complaint





Assessment:


COVID-19 pneumonia severe


BMI 40





Plan:


ICU status maintained


Maxed on Vapotherm


BiPAP initiated


High risk for intubation


High risk for death within 2 weeks


Lovenox


Steroids





Critical Care


Critically Ill Patient











BOOKERJENNIE WRENSUSAN HAGER                Sep 25, 2021 07:51

## 2021-09-26 VITALS — DIASTOLIC BLOOD PRESSURE: 86 MMHG | SYSTOLIC BLOOD PRESSURE: 122 MMHG

## 2021-09-26 VITALS — SYSTOLIC BLOOD PRESSURE: 136 MMHG | DIASTOLIC BLOOD PRESSURE: 81 MMHG

## 2021-09-26 VITALS — DIASTOLIC BLOOD PRESSURE: 69 MMHG | SYSTOLIC BLOOD PRESSURE: 112 MMHG

## 2021-09-26 VITALS — SYSTOLIC BLOOD PRESSURE: 123 MMHG | DIASTOLIC BLOOD PRESSURE: 70 MMHG

## 2021-09-26 VITALS — DIASTOLIC BLOOD PRESSURE: 66 MMHG | SYSTOLIC BLOOD PRESSURE: 124 MMHG

## 2021-09-26 VITALS — SYSTOLIC BLOOD PRESSURE: 111 MMHG | DIASTOLIC BLOOD PRESSURE: 72 MMHG

## 2021-09-26 VITALS — DIASTOLIC BLOOD PRESSURE: 72 MMHG | SYSTOLIC BLOOD PRESSURE: 118 MMHG

## 2021-09-26 VITALS — DIASTOLIC BLOOD PRESSURE: 72 MMHG | SYSTOLIC BLOOD PRESSURE: 108 MMHG

## 2021-09-26 VITALS — SYSTOLIC BLOOD PRESSURE: 111 MMHG | DIASTOLIC BLOOD PRESSURE: 74 MMHG

## 2021-09-26 VITALS — DIASTOLIC BLOOD PRESSURE: 77 MMHG | SYSTOLIC BLOOD PRESSURE: 114 MMHG

## 2021-09-26 VITALS — SYSTOLIC BLOOD PRESSURE: 124 MMHG | DIASTOLIC BLOOD PRESSURE: 78 MMHG

## 2021-09-26 VITALS — DIASTOLIC BLOOD PRESSURE: 74 MMHG | SYSTOLIC BLOOD PRESSURE: 111 MMHG

## 2021-09-26 VITALS — DIASTOLIC BLOOD PRESSURE: 76 MMHG | SYSTOLIC BLOOD PRESSURE: 113 MMHG

## 2021-09-26 VITALS — SYSTOLIC BLOOD PRESSURE: 103 MMHG | DIASTOLIC BLOOD PRESSURE: 67 MMHG

## 2021-09-26 VITALS — SYSTOLIC BLOOD PRESSURE: 121 MMHG | DIASTOLIC BLOOD PRESSURE: 76 MMHG

## 2021-09-26 VITALS — SYSTOLIC BLOOD PRESSURE: 117 MMHG | DIASTOLIC BLOOD PRESSURE: 67 MMHG

## 2021-09-26 VITALS — SYSTOLIC BLOOD PRESSURE: 110 MMHG | DIASTOLIC BLOOD PRESSURE: 77 MMHG

## 2021-09-26 VITALS — SYSTOLIC BLOOD PRESSURE: 114 MMHG | DIASTOLIC BLOOD PRESSURE: 75 MMHG

## 2021-09-26 VITALS — SYSTOLIC BLOOD PRESSURE: 121 MMHG | DIASTOLIC BLOOD PRESSURE: 80 MMHG

## 2021-09-26 VITALS — DIASTOLIC BLOOD PRESSURE: 80 MMHG | SYSTOLIC BLOOD PRESSURE: 126 MMHG

## 2021-09-26 VITALS — DIASTOLIC BLOOD PRESSURE: 81 MMHG | SYSTOLIC BLOOD PRESSURE: 136 MMHG

## 2021-09-26 VITALS — DIASTOLIC BLOOD PRESSURE: 73 MMHG | SYSTOLIC BLOOD PRESSURE: 119 MMHG

## 2021-09-26 VITALS — DIASTOLIC BLOOD PRESSURE: 80 MMHG | SYSTOLIC BLOOD PRESSURE: 121 MMHG

## 2021-09-26 LAB
BASOPHILS # BLD AUTO: 0 10^3/UL (ref 0–0.1)
BASOPHILS NFR BLD AUTO: 0 % (ref 0–10)
BUN/CREAT SERPL: 30
CALCIUM SERPL-MCNC: 9 MG/DL (ref 8.5–10.1)
CHLORIDE SERPL-SCNC: 103 MMOL/L (ref 98–107)
CO2 SERPL-SCNC: 23 MMOL/L (ref 21–32)
CREAT SERPL-MCNC: 0.81 MG/DL (ref 0.6–1.3)
EOSINOPHIL # BLD AUTO: 0.2 10^3/UL (ref 0–0.3)
EOSINOPHIL NFR BLD AUTO: 2 % (ref 0–10)
GFR SERPLBLD BASED ON 1.73 SQ M-ARVRAT: 114 ML/MIN
GLUCOSE SERPL-MCNC: 89 MG/DL (ref 70–105)
HCT VFR BLD CALC: 47 % (ref 40–54)
HGB BLD-MCNC: 15.4 G/DL (ref 13.3–17.7)
LYMPHOCYTES # BLD AUTO: 1.5 10^3/UL (ref 1–4)
LYMPHOCYTES NFR BLD AUTO: 20 % (ref 12–44)
MAGNESIUM SERPL-MCNC: 2.6 MG/DL (ref 1.6–2.4)
MANUAL DIFFERENTIAL PERFORMED BLD QL: NO
MCH RBC QN AUTO: 28 PG (ref 25–34)
MCHC RBC AUTO-ENTMCNC: 33 G/DL (ref 32–36)
MCV RBC AUTO: 85 FL (ref 80–99)
MONOCYTES # BLD AUTO: 0.3 10^3/UL (ref 0–1)
MONOCYTES NFR BLD AUTO: 4 % (ref 0–12)
NEUTROPHILS # BLD AUTO: 5.7 10^3/UL (ref 1.8–7.8)
NEUTROPHILS NFR BLD AUTO: 73 % (ref 42–75)
PHOSPHATE SERPL-MCNC: 4.3 MG/DL (ref 2.3–4.7)
PLATELET # BLD: 330 10^3/UL (ref 130–400)
PMV BLD AUTO: 9.7 FL (ref 9–12.2)
POTASSIUM SERPL-SCNC: 4.2 MMOL/L (ref 3.6–5)
SODIUM SERPL-SCNC: 139 MMOL/L (ref 135–145)
WBC # BLD AUTO: 7.8 10^3/UL (ref 4.3–11)

## 2021-09-26 RX ADMIN — FAMOTIDINE SCH MG: 20 TABLET, FILM COATED ORAL at 20:21

## 2021-09-26 RX ADMIN — POLYETHYLENE GLYCOL (3350) SCH GM: 17 POWDER, FOR SOLUTION ORAL at 20:22

## 2021-09-26 RX ADMIN — POTASSIUM CHLORIDE SCH MEQ: 1500 TABLET, EXTENDED RELEASE ORAL at 06:35

## 2021-09-26 RX ADMIN — MAGNESIUM SULFATE IN DEXTROSE SCH MLS/HR: 10 INJECTION, SOLUTION INTRAVENOUS at 06:35

## 2021-09-26 RX ADMIN — INSULIN ASPART SCH UNIT: 100 INJECTION, SOLUTION INTRAVENOUS; SUBCUTANEOUS at 06:35

## 2021-09-26 RX ADMIN — POTASSIUM CHLORIDE SCH MLS/HR: 200 INJECTION, SOLUTION INTRAVENOUS at 06:35

## 2021-09-26 RX ADMIN — ENOXAPARIN SODIUM SCH MG: 100 INJECTION SUBCUTANEOUS at 17:56

## 2021-09-26 RX ADMIN — POLYETHYLENE GLYCOL (3350) SCH GM: 17 POWDER, FOR SOLUTION ORAL at 08:23

## 2021-09-26 RX ADMIN — GUAIFENESIN SCH MG: 600 TABLET, EXTENDED RELEASE ORAL at 20:21

## 2021-09-26 RX ADMIN — ALBUTEROL SULFATE SCH PUFF: 90 AEROSOL, METERED RESPIRATORY (INHALATION) at 02:46

## 2021-09-26 RX ADMIN — DOCUSATE SODIUM AND SENNOSIDES SCH EA: 8.6; 5 TABLET, FILM COATED ORAL at 20:22

## 2021-09-26 RX ADMIN — FAMOTIDINE SCH MG: 20 TABLET, FILM COATED ORAL at 08:23

## 2021-09-26 RX ADMIN — GUAIFENESIN SCH MG: 600 TABLET, EXTENDED RELEASE ORAL at 08:23

## 2021-09-26 RX ADMIN — INSULIN ASPART SCH UNIT: 100 INJECTION, SOLUTION INTRAVENOUS; SUBCUTANEOUS at 17:53

## 2021-09-26 RX ADMIN — INSULIN ASPART SCH UNIT: 100 INJECTION, SOLUTION INTRAVENOUS; SUBCUTANEOUS at 20:43

## 2021-09-26 RX ADMIN — ALBUTEROL SULFATE SCH PUFF: 90 AEROSOL, METERED RESPIRATORY (INHALATION) at 22:05

## 2021-09-26 RX ADMIN — ALBUTEROL SULFATE SCH PUFF: 90 AEROSOL, METERED RESPIRATORY (INHALATION) at 18:42

## 2021-09-26 RX ADMIN — ALBUTEROL SULFATE SCH PUFF: 90 AEROSOL, METERED RESPIRATORY (INHALATION) at 15:13

## 2021-09-26 RX ADMIN — ALBUTEROL SULFATE SCH PUFF: 90 AEROSOL, METERED RESPIRATORY (INHALATION) at 06:58

## 2021-09-26 RX ADMIN — FLUTICASONE PROPIONATE AND SALMETEROL SCH EACH: 113; 14 POWDER, METERED RESPIRATORY (INHALATION) at 11:10

## 2021-09-26 RX ADMIN — MELATONIN 3 MG ORAL TABLET SCH MG: 3 TABLET ORAL at 20:21

## 2021-09-26 RX ADMIN — INSULIN ASPART SCH UNIT: 100 INJECTION, SOLUTION INTRAVENOUS; SUBCUTANEOUS at 11:30

## 2021-09-26 RX ADMIN — FLUTICASONE PROPIONATE AND SALMETEROL SCH EACH: 113; 14 POWDER, METERED RESPIRATORY (INHALATION) at 22:05

## 2021-09-26 RX ADMIN — DOCUSATE SODIUM AND SENNOSIDES SCH EA: 8.6; 5 TABLET, FILM COATED ORAL at 08:23

## 2021-09-26 RX ADMIN — ALBUTEROL SULFATE SCH PUFF: 90 AEROSOL, METERED RESPIRATORY (INHALATION) at 11:10

## 2021-09-26 NOTE — PROGRESS NOTE - HOSPITALIST
Subjective


HPI/CC On Admission


Date Seen by Provider:  Sep 26, 2021


Time Seen by Provider:  12:00


CC: SOB from Covid-19 pneumonia 





HPI: This is a 25yoWM who is presenting for the [facility]ener[facility] infusion at [facility] found 

to be hypoxic and SOB, Pt was assessed in the ER found to have hypoxia and 

ABG/PO2 of 50. He required 7 liters of oxygen, his BMI is 40 and since he is at 

high risk for intubation he will be moved to cardiac step-down to be closer to 

pulmonary critical care. 





To note he reports he is not diabetic but he is having hyperglycemia with the 

steroid administration in addition he appears to have sleep apnea undiagnosed 

but he is a lifetime non-smoker.


Subjective/Events-last exam


Patient doing a little better


On Vapotherm 40 L at 90%


Sitting up in chair


Bowels are moving


Eating and drinking fairly well





Review of Systems


Pulmonary:  Dyspnea





Objective


Exam


Vital Signs





Vital Signs








  Date Time  Temp Pulse Resp B/P (MAP) Pulse Ox O2 Delivery O2 Flow Rate FiO2


 


9/26/21 18:42     93 Vapotherm 35.00 80


 


9/26/21 18:00  87 16 122/86 (98)    


 


9/25/21 20:08 35.6       





Capillary Refill :


General Appearance:  No Apparent Distress, WD/WN, Chronically ill, Obese


Respiratory:  Lungs Clear, Normal Breath Sounds, Decreased Breath Sounds


Cardiovascular:  Regular Rate, Rhythm


Neurologic/Psychiatric:  Alert, Oriented x3





Results/Procedures


Lab


Laboratory Tests


9/26/21 05:06








Patient resulted labs reviewed.





Assessment/Plan


Assessment and Plan


Assess & Plan/Chief Complaint





Assessment:


COVID-19 pneumonia severe


BMI 40





Plan:


ICU status maintained


Maxed on Vapotherm


BiPAP initiated


High risk for intubation


High risk for death within 2 weeks


Lovenox


Steroids





9/26/2021:


Continue Vapotherm still high risk for intubation





Critical Care


Critically Ill Patient











SUNITA BOOKER DO                Sep 26, 2021 06:41

## 2021-09-26 NOTE — TELE-ICU PROGRESS NOTE
Subjective


Date Seen by a Provider:  Sep 26, 2021


Time Seen by a Provider:  08:00


Subjective/Events-last exam


This virtual visit was conducted using real time audio/video. 


Thank you for asking us to see this patient for respiratory insufficiency and 

distress due to Covid pna, possible undiagnosed VERNA. 


HPC: Recent events: Not accepted at [facility] as not on vent. Switched to VT.


PE: Obese, comfortable on VT. VSS.  O2 sat 93% on VT 40 L/100%


HEENT: No obvious masses, adenopathy or JVD. 


Chest: clear to auscultation. 


CV: RRR S1 S2 No murmur or added sounds. 


Abd: Non-tender. Bowel sounds Y. 


: Unremarkable. Rodriguez N. 


CNS/psychiatric: Alert and oriented, grossly intact. No obvious focal findings. 


Extremities: No edema. Capillary refill < 3 seconds. 


Skin: unremarkable. 


Results: CXR 9/22 w B infilts.


A/P: Respiratory insufficiency/distress: Cont alb., Airduo, wean VT as amira.


Available chart/ vitals / labs /images reviewed.


Video assessment done using teleICU camera, rest of exam as per RN.


 Critical Care: critically ill patient. Cont Pepcid, Dex., Lov., insulin.


Discussed with CRYSTAL Varner. Asked RN to reach out to eICU if any questions or 

concerns later. Time spent with patient/coordination of care with other health 

professionals (mins): 20





Sepsis Event


Evaluation


Height, Weight, BMI


Height: '"


Weight: lbs. oz. kg; 40.35 BMI


Method:





Exam


Exam


Patient acknowledged, consented, and participated in this virtual visit which 

was conducted using real time audio/video


Vital Signs








  Date Time  Temp Pulse Resp B/P (MAP) Pulse Ox O2 Delivery O2 Flow Rate FiO2


 


9/26/21 08:36      Vapotherm 40.00 





       100.00 


 


9/26/21 08:00  61 28 103/67 (79) 95 NIV Bilevel 85.00 


 


9/26/21 07:00  63      


 


9/26/21 07:00  60 31 112/69 (83) 92 NIV Bilevel 85.00 


 


9/26/21 06:59  65 34  94  70.00 


 


9/26/21 06:00  66 17 136/81 (99) 92 NIV Bilevel 85.00 


 


9/26/21 05:00  55 22 118/72 (87) 94 NIV Bilevel 85.00 


 


9/26/21 04:00  61 33 114/75 (88) 94 NIV Bilevel 85.00 


 


9/26/21 03:00  55 22 114/77 (89) 97 NIV Bilevel 85.00 


 


9/26/21 02:46  59 28  61  75.00 


 


9/26/21 02:00  66 25 121/80 (94) 94 NIV Bilevel 85.00 


 


9/26/21 01:00  63 29 110/77 (88) 93 NIV Bilevel 85.00 


 


9/26/21 01:00  63      


 


9/26/21 00:00  59 24 108/72 (84) 92 NIV Bilevel 85.00 


 


9/25/21 23:00  61 26 117/73 (88) 91 NIV Bilevel 85.00 


 


9/25/21 22:18      NIV Bilevel 85.00 


 


9/25/21 22:14  62 28  94  75.00 


 


9/25/21 22:00  61 26 118/72 (87) 94 Vapotherm 40.00 





       100.00 


 


9/25/21 21:00  63 23 118/78 (91) 93 Vapotherm 40.00 





       100.00 


 


9/25/21 20:08 35.6       


 


9/25/21 20:00  70 25 127/74 (91) 92 Vapotherm 40.00 





       100.00 


 


9/25/21 20:00     92 Vapotherm 40.00 100


 


9/25/21 19:00      Vapotherm 40.00 





       100.00 


 


9/25/21 19:00  64 24 140/95 (110) 94 Vapotherm 40.00 





       100.00 


 


9/25/21 19:00  64      


 


9/25/21 18:29     90 Vapotherm 40.00 100


 


9/25/21 18:00  67 29 126/90 (102) 91 NIV Bilevel 85.00 


 


9/25/21 17:00  53 15 115/67 (83) 94 NIV Bilevel 85.00 


 


9/25/21 16:05 35.7       


 


9/25/21 16:00  64 17 125/69 (87) 92 NIV Bilevel 85.00 


 


9/25/21 15:35     95 Vapotherm 40.00 100


 


9/25/21 15:00  68 16 121/63 (82) 94 NIV Bilevel 85.00 


 


9/25/21 14:00  54 24  94 NIV Bilevel 85.00 


 


9/25/21 13:00  60      


 


9/25/21 13:00  83 23  90 NIV Bilevel 85.00 


 


9/25/21 12:00  55 24 141/108 (119) 96 NIV Bilevel 85.00 


 


9/25/21 11:28 36.6       


 


9/25/21 11:00  61 26 117/86 (96) 93 NIV Bilevel 85.00 


 


9/25/21 10:13  60 31  93  70.00 


 


9/25/21 10:00  61 33 128/76 (93) 95 NIV Bilevel 85.00 


 


9/25/21 09:00  55 23 115/80 (92) 95 NIV Bilevel 85.00 














I & O 


 


 9/26/21





 07:00


 


Intake Total 700 ml


 


Output Total 1250 ml


 


Balance -550 ml








Height & Weight


Height: '"


Weight: lbs. oz. kg; 40.35 BMI


Method:


General Appearance:  No Apparent Distress, Anxious, Chronically ill


HEENT:  PERRL/EOMI, Normal ENT Inspection, Pharynx Normal, Moist Mucous 

Membranes; No Pharyngeal Erythema; Other (no visible intraoral abscesses or 

drainage. )


Neck:  Full Range of Motion, Normal Inspection, Non Tender


Respiratory:  No Accessory Muscle Use, No Respiratory Distress, Decreased Breath

Sounds


Cardiovascular:  Regular Rate, Rhythm


Peripheral Pulses:  1+ Dorsalis Pedis (R), 1+ Left Dors-Pedis (L)


Extremity:  Normal Capillary Refill, No Calf Tenderness


Neurologic/Psychiatric:  Alert, Oriented x3, No Motor/Sensory Deficits, Normal 

Mood/Affect


Skin:  Normal Color, Warm/Dry


Lymphatic:  No Adenopathy





Results


Lab


Laboratory Tests


9/25/21 05:21








9/26/21 05:06











Assessment/Plan


Assessment/Plan


See free text.


Critical Care:  Critically Ill Patient











SARAH MEDINA MD          Sep 26, 2021 08:57

## 2021-09-27 VITALS — SYSTOLIC BLOOD PRESSURE: 113 MMHG | DIASTOLIC BLOOD PRESSURE: 70 MMHG

## 2021-09-27 VITALS — DIASTOLIC BLOOD PRESSURE: 83 MMHG | SYSTOLIC BLOOD PRESSURE: 134 MMHG

## 2021-09-27 VITALS — SYSTOLIC BLOOD PRESSURE: 117 MMHG | DIASTOLIC BLOOD PRESSURE: 81 MMHG

## 2021-09-27 VITALS — DIASTOLIC BLOOD PRESSURE: 72 MMHG | SYSTOLIC BLOOD PRESSURE: 114 MMHG

## 2021-09-27 VITALS — SYSTOLIC BLOOD PRESSURE: 123 MMHG | DIASTOLIC BLOOD PRESSURE: 81 MMHG

## 2021-09-27 VITALS — SYSTOLIC BLOOD PRESSURE: 121 MMHG | DIASTOLIC BLOOD PRESSURE: 86 MMHG

## 2021-09-27 VITALS — SYSTOLIC BLOOD PRESSURE: 115 MMHG | DIASTOLIC BLOOD PRESSURE: 80 MMHG

## 2021-09-27 VITALS — DIASTOLIC BLOOD PRESSURE: 74 MMHG | SYSTOLIC BLOOD PRESSURE: 119 MMHG

## 2021-09-27 VITALS — DIASTOLIC BLOOD PRESSURE: 92 MMHG | SYSTOLIC BLOOD PRESSURE: 126 MMHG

## 2021-09-27 VITALS — SYSTOLIC BLOOD PRESSURE: 110 MMHG | DIASTOLIC BLOOD PRESSURE: 68 MMHG

## 2021-09-27 VITALS — SYSTOLIC BLOOD PRESSURE: 120 MMHG | DIASTOLIC BLOOD PRESSURE: 81 MMHG

## 2021-09-27 VITALS — SYSTOLIC BLOOD PRESSURE: 119 MMHG | DIASTOLIC BLOOD PRESSURE: 80 MMHG

## 2021-09-27 VITALS — SYSTOLIC BLOOD PRESSURE: 114 MMHG | DIASTOLIC BLOOD PRESSURE: 77 MMHG

## 2021-09-27 VITALS — DIASTOLIC BLOOD PRESSURE: 77 MMHG | SYSTOLIC BLOOD PRESSURE: 112 MMHG

## 2021-09-27 VITALS — SYSTOLIC BLOOD PRESSURE: 112 MMHG | DIASTOLIC BLOOD PRESSURE: 77 MMHG

## 2021-09-27 VITALS — DIASTOLIC BLOOD PRESSURE: 88 MMHG | SYSTOLIC BLOOD PRESSURE: 117 MMHG

## 2021-09-27 VITALS — SYSTOLIC BLOOD PRESSURE: 120 MMHG | DIASTOLIC BLOOD PRESSURE: 84 MMHG

## 2021-09-27 VITALS — SYSTOLIC BLOOD PRESSURE: 100 MMHG | DIASTOLIC BLOOD PRESSURE: 53 MMHG

## 2021-09-27 VITALS — SYSTOLIC BLOOD PRESSURE: 126 MMHG | DIASTOLIC BLOOD PRESSURE: 77 MMHG

## 2021-09-27 VITALS — DIASTOLIC BLOOD PRESSURE: 77 MMHG | SYSTOLIC BLOOD PRESSURE: 122 MMHG

## 2021-09-27 VITALS — DIASTOLIC BLOOD PRESSURE: 74 MMHG | SYSTOLIC BLOOD PRESSURE: 112 MMHG

## 2021-09-27 LAB
BASOPHILS # BLD AUTO: 0 10^3/UL (ref 0–0.1)
BASOPHILS NFR BLD AUTO: 0 % (ref 0–10)
BUN/CREAT SERPL: 30
CALCIUM SERPL-MCNC: 8.8 MG/DL (ref 8.5–10.1)
CHLORIDE SERPL-SCNC: 103 MMOL/L (ref 98–107)
CO2 SERPL-SCNC: 23 MMOL/L (ref 21–32)
CREAT SERPL-MCNC: 0.71 MG/DL (ref 0.6–1.3)
EOSINOPHIL # BLD AUTO: 0.4 10^3/UL (ref 0–0.3)
EOSINOPHIL NFR BLD AUTO: 4 % (ref 0–10)
GFR SERPLBLD BASED ON 1.73 SQ M-ARVRAT: 133 ML/MIN
GLUCOSE SERPL-MCNC: 93 MG/DL (ref 70–105)
HCT VFR BLD CALC: 45 % (ref 40–54)
HGB BLD-MCNC: 15 G/DL (ref 13.3–17.7)
LYMPHOCYTES # BLD AUTO: 1.6 10^3/UL (ref 1–4)
LYMPHOCYTES NFR BLD AUTO: 19 % (ref 12–44)
MAGNESIUM SERPL-MCNC: 2.3 MG/DL (ref 1.6–2.4)
MANUAL DIFFERENTIAL PERFORMED BLD QL: NO
MCH RBC QN AUTO: 28 PG (ref 25–34)
MCHC RBC AUTO-ENTMCNC: 33 G/DL (ref 32–36)
MCV RBC AUTO: 84 FL (ref 80–99)
MONOCYTES # BLD AUTO: 0.5 10^3/UL (ref 0–1)
MONOCYTES NFR BLD AUTO: 6 % (ref 0–12)
NEUTROPHILS # BLD AUTO: 5.7 10^3/UL (ref 1.8–7.8)
NEUTROPHILS NFR BLD AUTO: 70 % (ref 42–75)
PHOSPHATE SERPL-MCNC: 3.6 MG/DL (ref 2.3–4.7)
PLATELET # BLD: 354 10^3/UL (ref 130–400)
PMV BLD AUTO: 9.8 FL (ref 9–12.2)
POTASSIUM SERPL-SCNC: 4 MMOL/L (ref 3.6–5)
SODIUM SERPL-SCNC: 139 MMOL/L (ref 135–145)
WBC # BLD AUTO: 8.2 10^3/UL (ref 4.3–11)

## 2021-09-27 RX ADMIN — POTASSIUM CHLORIDE SCH MEQ: 1500 TABLET, EXTENDED RELEASE ORAL at 05:06

## 2021-09-27 RX ADMIN — INSULIN ASPART SCH UNIT: 100 INJECTION, SOLUTION INTRAVENOUS; SUBCUTANEOUS at 18:13

## 2021-09-27 RX ADMIN — GUAIFENESIN SCH MG: 600 TABLET, EXTENDED RELEASE ORAL at 08:10

## 2021-09-27 RX ADMIN — ALBUTEROL SULFATE SCH PUFF: 90 AEROSOL, METERED RESPIRATORY (INHALATION) at 10:53

## 2021-09-27 RX ADMIN — FLUTICASONE PROPIONATE AND SALMETEROL SCH EACH: 113; 14 POWDER, METERED RESPIRATORY (INHALATION) at 10:53

## 2021-09-27 RX ADMIN — FAMOTIDINE SCH MG: 20 TABLET, FILM COATED ORAL at 08:10

## 2021-09-27 RX ADMIN — FAMOTIDINE SCH MG: 20 TABLET, FILM COATED ORAL at 21:29

## 2021-09-27 RX ADMIN — ALBUTEROL SULFATE SCH PUFF: 90 AEROSOL, METERED RESPIRATORY (INHALATION) at 02:06

## 2021-09-27 RX ADMIN — GUAIFENESIN SCH MG: 600 TABLET, EXTENDED RELEASE ORAL at 21:28

## 2021-09-27 RX ADMIN — ENOXAPARIN SODIUM SCH MG: 100 INJECTION SUBCUTANEOUS at 18:54

## 2021-09-27 RX ADMIN — INSULIN ASPART SCH UNIT: 100 INJECTION, SOLUTION INTRAVENOUS; SUBCUTANEOUS at 20:48

## 2021-09-27 RX ADMIN — ALBUTEROL SULFATE SCH PUFF: 90 AEROSOL, METERED RESPIRATORY (INHALATION) at 18:35

## 2021-09-27 RX ADMIN — DOCUSATE SODIUM AND SENNOSIDES SCH EA: 8.6; 5 TABLET, FILM COATED ORAL at 08:14

## 2021-09-27 RX ADMIN — POTASSIUM CHLORIDE SCH MLS/HR: 200 INJECTION, SOLUTION INTRAVENOUS at 05:06

## 2021-09-27 RX ADMIN — INSULIN ASPART SCH UNIT: 100 INJECTION, SOLUTION INTRAVENOUS; SUBCUTANEOUS at 05:06

## 2021-09-27 RX ADMIN — FLUTICASONE PROPIONATE AND SALMETEROL SCH EACH: 113; 14 POWDER, METERED RESPIRATORY (INHALATION) at 18:35

## 2021-09-27 RX ADMIN — ALBUTEROL SULFATE SCH PUFF: 90 AEROSOL, METERED RESPIRATORY (INHALATION) at 14:32

## 2021-09-27 RX ADMIN — POLYETHYLENE GLYCOL (3350) SCH GM: 17 POWDER, FOR SOLUTION ORAL at 20:48

## 2021-09-27 RX ADMIN — MAGNESIUM SULFATE IN DEXTROSE SCH MLS/HR: 10 INJECTION, SOLUTION INTRAVENOUS at 05:06

## 2021-09-27 RX ADMIN — MELATONIN 3 MG ORAL TABLET SCH MG: 3 TABLET ORAL at 21:29

## 2021-09-27 RX ADMIN — ALBUTEROL SULFATE SCH PUFF: 90 AEROSOL, METERED RESPIRATORY (INHALATION) at 21:50

## 2021-09-27 RX ADMIN — INSULIN ASPART SCH UNIT: 100 INJECTION, SOLUTION INTRAVENOUS; SUBCUTANEOUS at 14:32

## 2021-09-27 RX ADMIN — POLYETHYLENE GLYCOL (3350) SCH GM: 17 POWDER, FOR SOLUTION ORAL at 08:14

## 2021-09-27 RX ADMIN — DOCUSATE SODIUM AND SENNOSIDES SCH EA: 8.6; 5 TABLET, FILM COATED ORAL at 20:48

## 2021-09-27 RX ADMIN — ALBUTEROL SULFATE SCH PUFF: 90 AEROSOL, METERED RESPIRATORY (INHALATION) at 07:03

## 2021-09-27 NOTE — PROGRESS NOTE
Subjective


Subjective/Events-last exam


States that he feels about the same. + shortness of breath with minimal 

improvement. Tolerating PO diet.


Review of Systems


General:  Fatigue, Malaise


Pulmonary:  Dyspnea


Cardiovascular:  No: Chest Pain, Palpitations


Gastrointestinal:  No: Nausea, Vomiting, Abdominal Pain, Diarrhea, Constipation


Neurological:  Weakness, Incoordination





Focused Exam


Time of Focused Exam:  07:00





Objective


Exam


Last Set of Vital Signs





Vital Signs








  Date Time  Temp Pulse Resp B/P (MAP) Pulse Ox O2 Delivery O2 Flow Rate FiO2


 


9/27/21 15:32      Vapotherm 30.00 60


 


9/27/21 15:00  95 23 100/53 (69) 95   


 


9/27/21 08:34 35.6       





Capillary Refill :


I&O











Intake and Output 


 


 9/27/21





 00:00


 


Intake Total 1300 ml


 


Output Total 1550 ml


 


Balance -250 ml


 


 


 


Intake Oral 1300 ml


 


Output Urine Total 1550 ml


 


# Bowel Movements 1








General:  Alert, Oriented X3, Cooperative, Mild Distress (with any activity)


Lungs:  Clear to Auscultation, Normal Air Movement


Heart:  Regular Rate, No Murmurs


Abdomen:  Normal Bowel Sounds, Soft, No Tenderness, No Masses


Extremities:  No Edema, No Tenderness/Swelling


Skin:  No Rashes


Neuro:  Normal Speech, Sensation Intact, Cranial Nerves 3-12 NL


Psych/Mental Status:  Mental Status NL, Mood NL





Results/Procedures


Lab


Laboratory Tests


9/26/21 20:26: Glucometer 144H


9/27/21 03:35: 


White Blood Count 8.2, Red Blood Count 5.37, Hemoglobin 15.0, Hematocrit 45, 

Mean Corpuscular Volume 84, Mean Corpuscular Hemoglobin 28, Mean Corpuscular 

Hemoglobin Concent 33, Red Cell Distribution Width 11.9, Platelet Count 354, 

Mean Platelet Volume 9.8, Immature Granulocyte % (Auto) 1, Neutrophils (%) 

(Auto) 70, Lymphocytes (%) (Auto) 19, Monocytes (%) (Auto) 6, Eosinophils (%) 

(Auto) 4, Basophils (%) (Auto) 0, Neutrophils # (Auto) 5.7, Lymphocytes # (Auto)

1.6, Monocytes # (Auto) 0.5, Eosinophils # (Auto) 0.4H, Basophils # (Auto) 0.0, 

Immature Granulocyte # (Auto) 0.1, Sodium Level 139, Potassium Level 4.0, 

Chloride Level 103, Carbon Dioxide Level 23, Anion Gap 13, Blood Urea Nitrogen 

21H, Creatinine 0.71, Estimat Glomerular Filtration Rate 133, BUN/Creatinine 

Ratio 30, Glucose Level 93, Calcium Level 8.8, Phosphorus Level 3.6, Magnesium 

Level 2.3


9/27/21 14:14: Glucometer 211H





Assessment/Plan


Assessment/Plan





(1) Acute and chronic respiratory failure with hypoxia


Status:  Acute


Assessment & Plan:  9/27: Vapotherm dependent, FiO2 80%, discussed the 

importance of IS and proning, will d/c espinal at patients request so he can 

prone, S/p Actrema, Dexamethasone, Tele ICU helping with ICU management





(2) Pneumonia due to COVID-19 virus


Status:  Acute


(3) BMI 39.0-39.9,adult


Status:  Chronic


(4) DVT prophylaxis


Status:  Acute


Assessment & Plan:  - VINI Caro MD               Sep 27, 2021 16:45

## 2021-09-27 NOTE — PULMONARY PROGRESS NOTE
LALI HALLMAN MED STUDENT 9/27/21 1052:


Subjective


Date Seen by a Provider:  Sep 27, 2021


Time Seen by a Provider:  07:00


Subjective/Events-last exam


This is Elijah a 24 yo male on day 6 of his hospital stay with the chief 

complaint of COVID-19. Pt was calm, cooperative, and engaged during questioning.

He stated that he is feeling well this morning. Pt stated that he is eating and 

drinking well. He is currently on vapotherm of 35L at 80%. Nursing staff tried 

to get him to lay in the prone position but he stated that it was uncomfortable 

and refused.


Review of Systems


General:  No Appetite


Pulmonary:  Dyspnea





Sepsis Event


Evaluation


Height, Weight, BMI


Height: '"


Weight: lbs. oz. kg; 40.35 BMI


Method:





Focused Exam


Time of Focused Exam:  07:00


Respiratory:  Chest Non Tender, No Accessory Muscle Use, Crackles, Decreased 

Breath Sounds


Cardiovascular:  Regular Rate, Rhythm, No Edema, No Gallop, No Murmur, Normal 

Peripheral Pulses


Skin:  normal color, warm/dry





Exam


Exam


Patient acknowledged, consented, and participated in this virtual visit which 

was conducted using real time audio/video


Vital Signs








  Date Time  Temp Pulse Resp B/P (MAP) Pulse Ox O2 Delivery O2 Flow Rate FiO2


 


9/27/21 10:00  58 35 112/74 (87) 93 Vapotherm 35.00 





       80.00 


 


9/27/21 09:00  57 20 119/80 (93) 94 Vapotherm 35.00 





       80.00 


 


9/27/21 08:34 35.6 52   95   70


 


9/27/21 08:15      Vapotherm 35.00 





       80.00 


 


9/27/21 08:00  52 18 112/77 (89) 99 NIV Bilevel 70.00 


 


9/27/21 08:00      Vapotherm 35.00 80


 


9/27/21 07:50 35.6       


 


9/27/21 07:03  52 27  95  70.00 


 


9/27/21 07:00  58 23 121/86 (98) 95 NIV Bilevel 70.00 


 


9/27/21 06:27  50      


 


9/27/21 06:00  48 34 120/81 (94) 98 NIV Bilevel 70.00 


 


9/27/21 05:00  55 38 117/81 (92) 94 NIV Bilevel 70.00 


 


9/27/21 04:00  54 31 115/80 (92) 96 NIV Bilevel 70.00 


 


9/27/21 04:00     94 NIV Bilevel  70


 


9/27/21 03:05 36.5     NIV Bilevel 70.00 


 


9/27/21 03:00  46 12 119/74 (89) 94 NIV Bilevel 70.00 


 


9/27/21 02:07  52 27  95  70.00 


 


9/27/21 02:00  46  114/72 (86) 96 NIV Bilevel 70.00 


 


9/27/21 01:00  60 31 120/84 (96) 94 NIV Bilevel 70.00 


 


9/27/21 01:00  60      


 


9/27/21 00:00     93 NIV Bilevel  70


 


9/27/21 00:00  57 33 123/81 (95) 94 NIV Bilevel 70.00 


 


9/26/21 23:28 36.7     NIV Bilevel 70.00 


 


9/26/21 23:00  64 31 126/80 (95) 94 NIV Bilevel 70.00 


 


9/26/21 22:06  68 30  94  70.00 


 


9/26/21 22:03     93 Vapotherm 35.00 80


 


9/26/21 22:00     96 NIV Bilevel 70.00 


 


9/26/21 22:00  69  111/74 (86) 92 NIV Bilevel 70.00 





        


 


9/26/21 21:00  76 27 119/73 (83) 93 Vapotherm 35.00 





       80.00 


 


9/26/21 20:30  93 18 113/76 (85) 94 Vapotherm 35.00 





       80.00 


 


9/26/21 20:00     91 Vapotherm 35.00 80


 


9/26/21 19:00 36.5     Vapotherm 35.00 





       80.00 


 


9/26/21 19:00  66      


 


9/26/21 19:00  62 36 124/66 (89) 94 Vapotherm 35.00 





       80.00 


 


9/26/21 18:42     93 Vapotherm 35.00 80


 


9/26/21 18:00  87 16 122/86 (98) 91 Vapotherm 35.00 





       80.00 


 


9/26/21 17:00  51 29 124/78 (93) 96 Vapotherm 35.00 





       80.00 


 


9/26/21 16:00  65 26 121/76 (91) 90 Vapotherm 35.00 





       80.00 


 


9/26/21 15:49      Vapotherm 35.00 





       80.00 


 


9/26/21 15:14     95 Vapotherm 35.00 80


 


9/26/21 15:00  51 25 117/67 (84) 95 Vapotherm 40.00 





       90.00 


 


9/26/21 14:00  70 17  92 Vapotherm 40.00 





       90.00 


 


9/26/21 13:00  67 23  93 Vapotherm 40.00 





       90.00 


 


9/26/21 12:51  66      


 


9/26/21 12:00  65 28  96 Vapotherm 40.00 





       90.00 


 


9/26/21 11:10     96 Vapotherm 40.00 90


 


9/26/21 11:00  63 22  94 Vapotherm 40.00 





       90.00 














I & O 


 


 9/27/21





 07:00


 


Intake Total 1410 ml


 


Output Total 1525 ml


 


Balance -115 ml








Height & Weight


Height: '"


Weight: lbs. oz. kg; 40.35 BMI


Method:


General Appearance:  No Apparent Distress, WD/WN, Chronically ill, Obese


HEENT:  PERRL/EOMI, Pharynx Normal, Moist Mucous Membranes


Neck:  Normal Inspection, Non Tender, Supple


Respiratory:  Lungs Clear, Normal Breath Sounds, Decreased Breath Sounds


Cardiovascular:  Regular Rate, Rhythm, No Edema, No Gallop, No Murmur


Peripheral Pulses:  1+ Dorsalis Pedis (R), 1+ Left Dors-Pedis (L)


Gastrointestinal:  normal bowel sounds, non tender, soft


Extremity:  Normal Capillary Refill, Normal Inspection, Non Tender, No Calf 

Tenderness, No Pedal Edema


Neurologic/Psychiatric:  Alert, Oriented x3, No Motor/Sensory Deficits, Normal 

Mood/Affect


Skin:  Normal Color, Warm/Dry





Results


Lab


Laboratory Tests


9/26/21 05:06








9/27/21 03:35











Assessment/Plan


Assessment/Plan


COVID-19 pneumonia


   continue steriods and breathing treatments


obesity


   BMI of 40


maintain ICU status


continue supplemental O2 


   vapotherm during the day and BiPAP at night


high risk for ventilation


DVT/PE prophylaxis 


   lovenox


repeat CXR


repeat d-dimer





IRMA JOHNSON MD 9/30/21 1430:


Supervisory-Addendum Brief


Verification & Attestation


Participated in pt care:  history, MDM, physical


Personally performed:  history, MDM, supervision of care


Care discussed with:  Medical Student


Procedures:  n/a


A medical student performed and documented this service. I reviewed information 

documented by the medical student .  Medical student performed patients 

physical exam . Medical decision making was done during tele-rounds with this 

medical student and a bedside RN . Please see my notes for details  /clarif

ication of assessment and plans











LALI HALLMAN MED STUDENT         Sep 27, 2021 10:52


IRMA JOHNSON MD         Sep 30, 2021 14:30

## 2021-09-27 NOTE — TELE-ICU PROGRESS NOTE
Subjective


Date Seen by a Provider:  Sep 27, 2021


Time Seen by a Provider:  10:28





Sepsis Event


Evaluation


Height, Weight, BMI


Height: '"


Weight: lbs. oz. kg; 40.35 BMI


Method:





Exam


Exam


Patient acknowledged, consented, and participated in this virtual visit which 

was conducted using real time audio/video


Vital Signs








  Date Time  Temp Pulse Resp B/P (MAP) Pulse Ox O2 Delivery O2 Flow Rate FiO2


 


9/27/21 10:00  58 35 112/74 (87) 93 Vapotherm 35.00 





       80.00 


 


9/27/21 09:00  57 20 119/80 (93) 94 Vapotherm 35.00 





       80.00 


 


9/27/21 08:34 35.6 52   95   70


 


9/27/21 08:15      Vapotherm 35.00 





       80.00 


 


9/27/21 08:00  52 18 112/77 (89) 99 NIV Bilevel 70.00 


 


9/27/21 08:00      Vapotherm 35.00 80


 


9/27/21 07:50 35.6       


 


9/27/21 07:03  52 27  95  70.00 


 


9/27/21 07:00  58 23 121/86 (98) 95 NIV Bilevel 70.00 


 


9/27/21 06:27  50      


 


9/27/21 06:00  48 34 120/81 (94) 98 NIV Bilevel 70.00 


 


9/27/21 05:00  55 38 117/81 (92) 94 NIV Bilevel 70.00 


 


9/27/21 04:00  54 31 115/80 (92) 96 NIV Bilevel 70.00 


 


9/27/21 04:00     94 NIV Bilevel  70


 


9/27/21 03:05 36.5     NIV Bilevel 70.00 


 


9/27/21 03:00  46 12 119/74 (89) 94 NIV Bilevel 70.00 


 


9/27/21 02:07  52 27  95  70.00 


 


9/27/21 02:00  46  114/72 (86) 96 NIV Bilevel 70.00 


 


9/27/21 01:00  60 31 120/84 (96) 94 NIV Bilevel 70.00 


 


9/27/21 01:00  60      


 


9/27/21 00:00     93 NIV Bilevel  70


 


9/27/21 00:00  57 33 123/81 (95) 94 NIV Bilevel 70.00 


 


9/26/21 23:28 36.7     NIV Bilevel 70.00 


 


9/26/21 23:00  64 31 126/80 (95) 94 NIV Bilevel 70.00 


 


9/26/21 22:06  68 30  94  70.00 


 


9/26/21 22:03     93 Vapotherm 35.00 80


 


9/26/21 22:00     96 NIV Bilevel 70.00 


 


9/26/21 22:00  69  111/74 (86) 92 NIV Bilevel 70.00 





        


 


9/26/21 21:00  76 27 119/73 (83) 93 Vapotherm 35.00 





       80.00 


 


9/26/21 20:30  93 18 113/76 (85) 94 Vapotherm 35.00 





       80.00 


 


9/26/21 20:00     91 Vapotherm 35.00 80


 


9/26/21 19:00 36.5     Vapotherm 35.00 





       80.00 


 


9/26/21 19:00  66      


 


9/26/21 19:00  62 36 124/66 (89) 94 Vapotherm 35.00 





       80.00 


 


9/26/21 18:42     93 Vapotherm 35.00 80


 


9/26/21 18:00  87 16 122/86 (98) 91 Vapotherm 35.00 





       80.00 


 


9/26/21 17:00  51 29 124/78 (93) 96 Vapotherm 35.00 





       80.00 


 


9/26/21 16:00  65 26 121/76 (91) 90 Vapotherm 35.00 





       80.00 


 


9/26/21 15:49      Vapotherm 35.00 





       80.00 


 


9/26/21 15:14     95 Vapotherm 35.00 80


 


9/26/21 15:00  51 25 117/67 (84) 95 Vapotherm 40.00 





       90.00 


 


9/26/21 14:00  70 17  92 Vapotherm 40.00 





       90.00 


 


9/26/21 13:00  67 23  93 Vapotherm 40.00 





       90.00 


 


9/26/21 12:51  66      


 


9/26/21 12:00  65 28  96 Vapotherm 40.00 





       90.00 


 


9/26/21 11:10     96 Vapotherm 40.00 90


 


9/26/21 11:00  63 22  94 Vapotherm 40.00 





       90.00 














I & O 


 


 9/27/21





 07:00


 


Intake Total 1410 ml


 


Output Total 1525 ml


 


Balance -115 ml








Height & Weight


Height: '"


Weight: lbs. oz. kg; 40.35 BMI


Method:


General Appearance:  No Apparent Distress, WD/WN, Chronically ill, Obese


HEENT:  PERRL/EOMI, Normal ENT Inspection, Pharynx Normal, Moist Mucous 

Membranes; No Pharyngeal Erythema; Other (no visible intraoral abscesses or 

drainage. )


Neck:  Full Range of Motion, Normal Inspection, Non Tender


Respiratory:  Lungs Clear, Normal Breath Sounds, Decreased Breath Sounds


Cardiovascular:  Regular Rate, Rhythm


Peripheral Pulses:  1+ Dorsalis Pedis (R), 1+ Left Dors-Pedis (L)


Extremity:  Normal Capillary Refill, No Calf Tenderness


Neurologic/Psychiatric:  Alert, Oriented x3


Skin:  Normal Color, Warm/Dry


Lymphatic:  No Adenopathy





Results


Lab


Laboratory Tests


9/26/21 05:06








9/27/21 03:35











Assessment/Plan


Assessment/Plan


(Tele-ICU Physician , Progress Note ) 





 


Available chart/ vitals / labs / Images reviewed 


Video assessment done using  teleICU camera, rest of exam as per RN 


Discussed with RN , EXAM AS PER RN 


Events overnight :  


Afebrile  


I/O = even 


Drips:  none


Pressors: , hemodynamically stable 


Consultants:  





Hospital course:


9/22- ARF , COVID  7l O2


9/24- transfer to % 50 L vapothem


9/27 - vapotherm 35L 80% , Bipap at night 16/10 = 70 % 





 


A/P 





AHRF / ARDS due to severe COVID19 


-9/27 - vapotherm 35L 80% 


-prone position if able , hope for  compliance 


- conservative fluid strategy (aim for even or negative fluid balance 


- CXR < PCT AND DDIMER TOMORROW 








SARS-Coronavirus-2/COVID-19 PNA 


( Not vaccinated , Dx   9/22  ) 


-Steroid  IV -  started 9/23


-Hypercoagulable state  , DDIMER 0.66  on   9/25 >  lovenox ppx dose  40 q 24 , 

follow D dimer 


- Actemra 9/23





monitor for superimposed bact PNA 


-PCT negative  9/25 , OFF abx  





Hyperglycemia, HB a1c 5.4 on admission 


- ISS , close f/up on steroids 





transaminitis likely due to COVID-19. 








Lines :  periph  (Central Line Necessity Reviewed) 


Rodriguez 9/24


OG: 


Nutrition:  po


Analgesia:  na


Anxiety/ delirium  xanax prn - not needed 


VTE Prophylaxis:  lovenox


Stress Ulcer Prophylaxis:  PPI


Glycemic Control:  





 


Plans in collaboration with bedside consultants and IM MDs. 


Discussed with RN to reach out if any questions or concerns 


A total of  33 minutes of critical care time was devoted to this patient today, 

required to treat and/or prevent further deterioration of critical care 

condition ( as above)











IRMA JOHNSON MD         Sep 27, 2021 10:29

## 2021-09-27 NOTE — PULMONARY PROCEDURES
Pulmonary Procedures


Date of Procedure


Date of Service:  Sep 27, 2021











LALI HALLMAN STUDENT         Sep 27, 2021 10:42

## 2021-09-27 NOTE — TELE-ICU PROGRESS NOTE
Subjective


Date Seen by a Provider:  Sep 27, 2021


Time Seen by a Provider:  07:00


Subjective/Events-last exam


This is Elijah a 24 yo male on day 6 of his hospital stay with the chief 

complaint of C





Sepsis Event


Evaluation


Height, Weight, BMI


Height: '"


Weight: lbs. oz. kg; 40.35 BMI


Method:





Exam


Exam


Patient acknowledged, consented, and participated in this virtual visit which 

was conducted using real time audio/video


Vital Signs








  Date Time  Temp Pulse Resp B/P (MAP) Pulse Ox O2 Delivery O2 Flow Rate FiO2


 


9/27/21 10:00  58 35 112/74 (87) 93 Vapotherm 35.00 





       80.00 


 


9/27/21 09:00  57 20 119/80 (93) 94 Vapotherm 35.00 





       80.00 


 


9/27/21 08:34 35.6 52   95   70


 


9/27/21 08:15      Vapotherm 35.00 





       80.00 


 


9/27/21 08:00  52 18 112/77 (89) 99 NIV Bilevel 70.00 


 


9/27/21 08:00      Vapotherm 35.00 80


 


9/27/21 07:50 35.6       


 


9/27/21 07:03  52 27  95  70.00 


 


9/27/21 07:00  58 23 121/86 (98) 95 NIV Bilevel 70.00 


 


9/27/21 06:27  50      


 


9/27/21 06:00  48 34 120/81 (94) 98 NIV Bilevel 70.00 


 


9/27/21 05:00  55 38 117/81 (92) 94 NIV Bilevel 70.00 


 


9/27/21 04:00  54 31 115/80 (92) 96 NIV Bilevel 70.00 


 


9/27/21 04:00     94 NIV Bilevel  70


 


9/27/21 03:05 36.5     NIV Bilevel 70.00 


 


9/27/21 03:00  46 12 119/74 (89) 94 NIV Bilevel 70.00 


 


9/27/21 02:07  52 27  95  70.00 


 


9/27/21 02:00  46  114/72 (86) 96 NIV Bilevel 70.00 


 


9/27/21 01:00  60 31 120/84 (96) 94 NIV Bilevel 70.00 


 


9/27/21 01:00  60      


 


9/27/21 00:00     93 NIV Bilevel  70


 


9/27/21 00:00  57 33 123/81 (95) 94 NIV Bilevel 70.00 


 


9/26/21 23:28 36.7     NIV Bilevel 70.00 


 


9/26/21 23:00  64 31 126/80 (95) 94 NIV Bilevel 70.00 


 


9/26/21 22:06  68 30  94  70.00 


 


9/26/21 22:03     93 Vapotherm 35.00 80


 


9/26/21 22:00     96 NIV Bilevel 70.00 


 


9/26/21 22:00  69  111/74 (86) 92 NIV Bilevel 70.00 





        


 


9/26/21 21:00  76 27 119/73 (83) 93 Vapotherm 35.00 





       80.00 


 


9/26/21 20:30  93 18 113/76 (85) 94 Vapotherm 35.00 





       80.00 


 


9/26/21 20:00     91 Vapotherm 35.00 80


 


9/26/21 19:00 36.5     Vapotherm 35.00 





       80.00 


 


9/26/21 19:00  66      


 


9/26/21 19:00  62 36 124/66 (89) 94 Vapotherm 35.00 





       80.00 


 


9/26/21 18:42     93 Vapotherm 35.00 80


 


9/26/21 18:00  87 16 122/86 (98) 91 Vapotherm 35.00 





       80.00 


 


9/26/21 17:00  51 29 124/78 (93) 96 Vapotherm 35.00 





       80.00 


 


9/26/21 16:00  65 26 121/76 (91) 90 Vapotherm 35.00 





       80.00 


 


9/26/21 15:49      Vapotherm 35.00 





       80.00 


 


9/26/21 15:14     95 Vapotherm 35.00 80


 


9/26/21 15:00  51 25 117/67 (84) 95 Vapotherm 40.00 





       90.00 


 


9/26/21 14:00  70 17  92 Vapotherm 40.00 





       90.00 


 


9/26/21 13:00  67 23  93 Vapotherm 40.00 





       90.00 


 


9/26/21 12:51  66      


 


9/26/21 12:00  65 28  96 Vapotherm 40.00 





       90.00 


 


9/26/21 11:10     96 Vapotherm 40.00 90


 


9/26/21 11:00  63 22  94 Vapotherm 40.00 





       90.00 














I & O 


 


 9/27/21





 07:00


 


Intake Total 1410 ml


 


Output Total 1525 ml


 


Balance -115 ml








Height & Weight


Height: '"


Weight: lbs. oz. kg; 40.35 BMI


Method:


General Appearance:  No Apparent Distress, WD/WN, Chronically ill, Obese


HEENT:  PERRL/EOMI, Normal ENT Inspection, Pharynx Normal, Moist Mucous 

Membranes; No Pharyngeal Erythema; Other (no visible intraoral abscesses or 

drainage. )


Neck:  Full Range of Motion, Normal Inspection, Non Tender


Respiratory:  Lungs Clear, Normal Breath Sounds, Decreased Breath Sounds


Cardiovascular:  Regular Rate, Rhythm


Peripheral Pulses:  1+ Dorsalis Pedis (R), 1+ Left Dors-Pedis (L)


Extremity:  Normal Capillary Refill, No Calf Tenderness


Neurologic/Psychiatric:  Alert, Oriented x3


Skin:  Normal Color, Warm/Dry


Lymphatic:  No Adenopathy





Results


Lab


Laboratory Tests


9/26/21 05:06








9/27/21 03:35

















LALI HALLMAN MED STUDENT         Sep 27, 2021 10:45

## 2021-09-28 VITALS — DIASTOLIC BLOOD PRESSURE: 84 MMHG | SYSTOLIC BLOOD PRESSURE: 122 MMHG

## 2021-09-28 VITALS — DIASTOLIC BLOOD PRESSURE: 84 MMHG | SYSTOLIC BLOOD PRESSURE: 109 MMHG

## 2021-09-28 VITALS — DIASTOLIC BLOOD PRESSURE: 82 MMHG | SYSTOLIC BLOOD PRESSURE: 116 MMHG

## 2021-09-28 VITALS — DIASTOLIC BLOOD PRESSURE: 73 MMHG | SYSTOLIC BLOOD PRESSURE: 114 MMHG

## 2021-09-28 VITALS — SYSTOLIC BLOOD PRESSURE: 102 MMHG | DIASTOLIC BLOOD PRESSURE: 62 MMHG

## 2021-09-28 VITALS — DIASTOLIC BLOOD PRESSURE: 81 MMHG | SYSTOLIC BLOOD PRESSURE: 115 MMHG

## 2021-09-28 VITALS — DIASTOLIC BLOOD PRESSURE: 74 MMHG | SYSTOLIC BLOOD PRESSURE: 109 MMHG

## 2021-09-28 VITALS — DIASTOLIC BLOOD PRESSURE: 84 MMHG | SYSTOLIC BLOOD PRESSURE: 123 MMHG

## 2021-09-28 VITALS — DIASTOLIC BLOOD PRESSURE: 64 MMHG | SYSTOLIC BLOOD PRESSURE: 124 MMHG

## 2021-09-28 VITALS — DIASTOLIC BLOOD PRESSURE: 63 MMHG | SYSTOLIC BLOOD PRESSURE: 102 MMHG

## 2021-09-28 VITALS — DIASTOLIC BLOOD PRESSURE: 71 MMHG | SYSTOLIC BLOOD PRESSURE: 111 MMHG

## 2021-09-28 VITALS — DIASTOLIC BLOOD PRESSURE: 57 MMHG | SYSTOLIC BLOOD PRESSURE: 97 MMHG

## 2021-09-28 VITALS — DIASTOLIC BLOOD PRESSURE: 79 MMHG | SYSTOLIC BLOOD PRESSURE: 118 MMHG

## 2021-09-28 VITALS — DIASTOLIC BLOOD PRESSURE: 63 MMHG | SYSTOLIC BLOOD PRESSURE: 106 MMHG

## 2021-09-28 VITALS — DIASTOLIC BLOOD PRESSURE: 62 MMHG | SYSTOLIC BLOOD PRESSURE: 97 MMHG

## 2021-09-28 VITALS — SYSTOLIC BLOOD PRESSURE: 120 MMHG | DIASTOLIC BLOOD PRESSURE: 71 MMHG

## 2021-09-28 VITALS — DIASTOLIC BLOOD PRESSURE: 73 MMHG | SYSTOLIC BLOOD PRESSURE: 104 MMHG

## 2021-09-28 LAB
BASOPHILS # BLD AUTO: 0 10^3/UL (ref 0–0.1)
BASOPHILS NFR BLD AUTO: 0 % (ref 0–10)
BUN/CREAT SERPL: 29
CALCIUM SERPL-MCNC: 9.1 MG/DL (ref 8.5–10.1)
CHLORIDE SERPL-SCNC: 103 MMOL/L (ref 98–107)
CO2 SERPL-SCNC: 23 MMOL/L (ref 21–32)
CREAT SERPL-MCNC: 0.77 MG/DL (ref 0.6–1.3)
EOSINOPHIL # BLD AUTO: 0.5 10^3/UL (ref 0–0.3)
EOSINOPHIL NFR BLD AUTO: 6 % (ref 0–10)
GFR SERPLBLD BASED ON 1.73 SQ M-ARVRAT: 121 ML/MIN
GLUCOSE SERPL-MCNC: 90 MG/DL (ref 70–105)
HCT VFR BLD CALC: 45 % (ref 40–54)
HGB BLD-MCNC: 15.2 G/DL (ref 13.3–17.7)
LYMPHOCYTES # BLD AUTO: 2.2 10^3/UL (ref 1–4)
LYMPHOCYTES NFR BLD AUTO: 27 % (ref 12–44)
MAGNESIUM SERPL-MCNC: 2.4 MG/DL (ref 1.6–2.4)
MANUAL DIFFERENTIAL PERFORMED BLD QL: NO
MCH RBC QN AUTO: 28 PG (ref 25–34)
MCHC RBC AUTO-ENTMCNC: 34 G/DL (ref 32–36)
MCV RBC AUTO: 84 FL (ref 80–99)
MONOCYTES # BLD AUTO: 0.6 10^3/UL (ref 0–1)
MONOCYTES NFR BLD AUTO: 8 % (ref 0–12)
NEUTROPHILS # BLD AUTO: 4.7 10^3/UL (ref 1.8–7.8)
NEUTROPHILS NFR BLD AUTO: 57 % (ref 42–75)
PHOSPHATE SERPL-MCNC: 4.1 MG/DL (ref 2.3–4.7)
PLATELET # BLD: 356 10^3/UL (ref 130–400)
PMV BLD AUTO: 9.5 FL (ref 9–12.2)
POTASSIUM SERPL-SCNC: 4.2 MMOL/L (ref 3.6–5)
SODIUM SERPL-SCNC: 136 MMOL/L (ref 135–145)
WBC # BLD AUTO: 8.1 10^3/UL (ref 4.3–11)

## 2021-09-28 RX ADMIN — ALBUTEROL SULFATE SCH PUFF: 90 AEROSOL, METERED RESPIRATORY (INHALATION) at 21:34

## 2021-09-28 RX ADMIN — POLYETHYLENE GLYCOL (3350) SCH GM: 17 POWDER, FOR SOLUTION ORAL at 20:04

## 2021-09-28 RX ADMIN — ALBUTEROL SULFATE SCH PUFF: 90 AEROSOL, METERED RESPIRATORY (INHALATION) at 06:53

## 2021-09-28 RX ADMIN — MAGNESIUM SULFATE IN DEXTROSE SCH MLS/HR: 10 INJECTION, SOLUTION INTRAVENOUS at 07:49

## 2021-09-28 RX ADMIN — POTASSIUM CHLORIDE SCH MEQ: 1500 TABLET, EXTENDED RELEASE ORAL at 07:49

## 2021-09-28 RX ADMIN — POLYETHYLENE GLYCOL (3350) SCH GM: 17 POWDER, FOR SOLUTION ORAL at 09:14

## 2021-09-28 RX ADMIN — ALBUTEROL SULFATE SCH PUFF: 90 AEROSOL, METERED RESPIRATORY (INHALATION) at 01:55

## 2021-09-28 RX ADMIN — ALBUTEROL SULFATE SCH PUFF: 90 AEROSOL, METERED RESPIRATORY (INHALATION) at 18:28

## 2021-09-28 RX ADMIN — POTASSIUM CHLORIDE SCH MLS/HR: 200 INJECTION, SOLUTION INTRAVENOUS at 07:49

## 2021-09-28 RX ADMIN — DOCUSATE SODIUM AND SENNOSIDES SCH EA: 8.6; 5 TABLET, FILM COATED ORAL at 20:04

## 2021-09-28 RX ADMIN — INSULIN ASPART SCH UNIT: 100 INJECTION, SOLUTION INTRAVENOUS; SUBCUTANEOUS at 07:48

## 2021-09-28 RX ADMIN — INSULIN ASPART SCH UNIT: 100 INJECTION, SOLUTION INTRAVENOUS; SUBCUTANEOUS at 20:03

## 2021-09-28 RX ADMIN — DIPHENHYDRAMINE HCL PRN MG: 25 TABLET ORAL at 20:03

## 2021-09-28 RX ADMIN — ENOXAPARIN SODIUM SCH MG: 100 INJECTION SUBCUTANEOUS at 17:17

## 2021-09-28 RX ADMIN — GUAIFENESIN SCH MG: 600 TABLET, EXTENDED RELEASE ORAL at 09:14

## 2021-09-28 RX ADMIN — ACETAMINOPHEN PRN MG: 325 TABLET ORAL at 17:23

## 2021-09-28 RX ADMIN — FLUTICASONE PROPIONATE AND SALMETEROL SCH EACH: 113; 14 POWDER, METERED RESPIRATORY (INHALATION) at 10:39

## 2021-09-28 RX ADMIN — DOCUSATE SODIUM AND SENNOSIDES SCH EA: 8.6; 5 TABLET, FILM COATED ORAL at 09:15

## 2021-09-28 RX ADMIN — GUAIFENESIN SCH MG: 600 TABLET, EXTENDED RELEASE ORAL at 20:04

## 2021-09-28 RX ADMIN — ALBUTEROL SULFATE SCH PUFF: 90 AEROSOL, METERED RESPIRATORY (INHALATION) at 14:47

## 2021-09-28 RX ADMIN — FLUTICASONE PROPIONATE AND SALMETEROL SCH EACH: 113; 14 POWDER, METERED RESPIRATORY (INHALATION) at 18:31

## 2021-09-28 RX ADMIN — FAMOTIDINE SCH MG: 20 TABLET, FILM COATED ORAL at 20:04

## 2021-09-28 RX ADMIN — INSULIN ASPART SCH UNIT: 100 INJECTION, SOLUTION INTRAVENOUS; SUBCUTANEOUS at 11:48

## 2021-09-28 RX ADMIN — FAMOTIDINE SCH MG: 20 TABLET, FILM COATED ORAL at 09:14

## 2021-09-28 RX ADMIN — ALBUTEROL SULFATE SCH PUFF: 90 AEROSOL, METERED RESPIRATORY (INHALATION) at 10:39

## 2021-09-28 RX ADMIN — MELATONIN 3 MG ORAL TABLET SCH MG: 3 TABLET ORAL at 20:04

## 2021-09-28 RX ADMIN — INSULIN ASPART SCH UNIT: 100 INJECTION, SOLUTION INTRAVENOUS; SUBCUTANEOUS at 17:23

## 2021-09-28 NOTE — TELE-ICU PROGRESS NOTE
Subjective


Date Seen by a Provider:  Sep 28, 2021


Time Seen by a Provider:  10:53





Sepsis Event


Evaluation


Height, Weight, BMI


Height: '"


Weight: lbs. oz. kg; 40.35 BMI


Method:





Focused Exam


Time of Focused Exam:  07:00





Exam


Exam


Patient acknowledged, consented, and participated in this virtual visit which 

was conducted using real time audio/video


Vital Signs








  Date Time  Temp Pulse Resp B/P (MAP) Pulse Ox O2 Delivery O2 Flow Rate FiO2


 


9/28/21 10:39     92 Vapotherm 25.00 55


 


9/28/21 10:00  69 13 104/73 (83) 95 Vapotherm 25.00 





       55.00 


 


9/28/21 09:19      Vapotherm 25.00 





       55.00 


 


9/28/21 09:00  60 32 109/74 (86) 96 NIV Bilevel 50.00 


 


9/28/21 08:45     98 NIV Bilevel  50


 


9/28/21 08:00  61 18 116/82 (93) 96 NIV Bilevel 50.00 


 


9/28/21 08:00 35.6       


 


9/28/21 07:00  77 16 109/84 (92) 95 NIV Bilevel 50.00 


 


9/28/21 06:54  64 20  97  50.00 


 


9/28/21 06:35  59      


 


9/28/21 06:13      NIV Bilevel 50.00 


 


9/28/21 06:00  75 14 115/81 (92) 98 NIV Bilevel 50.00 


 


9/28/21 05:00  64 24 114/73 (84) 97 NIV Bilevel 50.00 


 


9/28/21 04:00     95 NIV Bilevel  50


 


9/28/21 04:00  49 26 102/62 (74) 98 NIV Bilevel 50.00 


 


9/28/21 03:00  64 24 97/62 (73) 98 NIV Bilevel 50.00 


 


9/28/21 03:00 36.1     NIV Bilevel 50.00 


 


9/28/21 02:00  59 23 118/79 (87) 98 NIV Bilevel 50.00 


 


9/28/21 01:59      NIV Bilevel 50.00 


 


9/28/21 01:55  58 26  95  50.00 


 


9/28/21 01:00  52 29 102/63 (74) 98 NIV Bilevel 60.00 


 


9/28/21 01:00  61      


 


9/28/21 00:00     96 NIV Bilevel  60


 


9/28/21 00:00  68 8 122/84 (94) 99 NIV Bilevel 60.00 


 


9/27/21 23:00 36.2     NIV Bilevel 60.00 


 


9/27/21 23:00  59  114/77 (89) 97 NIV Bilevel 60.00 


 


9/27/21 21:50     94 Vapotherm 30.00 70


 


9/27/21 21:00  61 30 122/77 (92) 96 Vapotherm 30.00 





       70.00 


 


9/27/21 20:00     93 Vapotherm 30.00 70


 


9/27/21 20:00  68 24 110/68 (78) 94 Vapotherm 30.00 





       70.00 


 


9/27/21 19:51 35.9       


 


9/27/21 19:00      Vapotherm 30.00 





       70.00 


 


9/27/21 19:00  75      


 


9/27/21 19:00  75 25 126/92 (104) 93 Vapotherm 30.00 





       70.00 


 


9/27/21 18:36     92 Vapotherm 30.00 70


 


9/27/21 18:00  63 30 134/83 (100) 97 Vapotherm 30.00 





       70.00 


 


9/27/21 17:00  94 18  85 Vapotherm 30.00 





       70.00 


 


9/27/21 16:00  79 22  93 Vapotherm 30.00 





       70.00 


 


9/27/21 15:32      Vapotherm 30.00 60


 


9/27/21 15:00  95 23 100/53 (69) 95 Vapotherm 30.00 





       70.00 


 


9/27/21 14:32     96 Vapotherm 35.00 70


 


9/27/21 14:00  68 27 126/77 (93) 95 Vapotherm 30.00 





       70.00 


 


9/27/21 13:00  62  117/88 (98) 95 Vapotherm 30.00 





       70.00 


 


9/27/21 12:30      Vapotherm 30.00 70


 


9/27/21 12:23  74      


 


9/27/21 12:00  50  113/70 (84) 97 Vapotherm 30.00 





       70.00 


 


9/27/21 11:39      Vapotherm 30.00 





       70.00 


 


9/27/21 11:00  56 16 113/70 (84) 95 Vapotherm 35.00 





       80.00 


 


9/27/21 10:57      Vapotherm 35.00 70














I & O 


 


 9/28/21





 07:00


 


Intake Total 2262 ml


 


Output Total 1125 ml


 


Balance 1137 ml








Height & Weight


Height: '"


Weight: lbs. oz. kg; 40.35 BMI


Method:


General Appearance:  No Apparent Distress, WD/WN, Chronically ill, Obese


HEENT:  PERRL/EOMI, Pharynx Normal, Moist Mucous Membranes


Neck:  Normal Inspection, Non Tender, Supple


Respiratory:  Lungs Clear, Normal Breath Sounds, Decreased Breath Sounds


Cardiovascular:  Regular Rate, Rhythm, No Edema, No Gallop, No Murmur


Peripheral Pulses:  1+ Dorsalis Pedis (R), 1+ Left Dors-Pedis (L)


Gastrointestinal:  normal bowel sounds, non tender, soft


Extremity:  Normal Capillary Refill, Normal Inspection, Non Tender, No Calf 

Tenderness, No Pedal Edema


Neurologic/Psychiatric:  Alert, Oriented x3, No Motor/Sensory Deficits, Normal M

ood/Affect


Skin:  Normal Color, Warm/Dry





Results


Lab


Laboratory Tests


9/27/21 03:35








9/28/21 06:00











Assessment/Plan


Assessment/Plan


(Tele-ICU Physician , Progress Note ) 





 


Available chart/ vitals / labs / Images reviewed 


Video assessment done using  teleICU camera, rest of exam as per RN 


Discussed with RN , EXAM AS PER RN 


Events overnight :  


Afebrile  


I/O =  pos 700


Drips:  none


Pressors: , hemodynamically stable 


Consultants:  





Hospital course:


9/22- ARF , COVID  7l O2


9/24- transfer to % 50 L vapothem


9/27 - vapotherm 35L 80% , Bipap at night 16/10 = 70 % 





 


A/P 





AHRF / ARDS due to severe COVID19 


-9/27 - vapotherm 25L 55%  , BIPAP 50 %  night - IMPROVING 


-prone position if able , hope for  compliance 


- conservative fluid strategy (aim for even or negative fluid balance 








SARS-Coronavirus-2/COVID-19 PNA 


( Not vaccinated , Dx   9/22  ) 


-Steroid  IV -  started 9/23


-Hypercoagulable state  , DDIMER 0.66  on   9/25 >  lovenox ppx dose  40 q 24 , 

D dimer  9/28 WNL


- Actemra 9/23





monitor for superimposed bact PNA 


-PCT negative  9/25  and 9/28 , OFF abx  





Hyperglycemia, HB a1c 5.4 on admission 


- ISS , close f/up on steroids 





transaminitis likely due to COVID-19. 








Lines :  periph  (Central Line Necessity Reviewed) 


Rodriguez 9/24 - removed 9/28 


OG: 


Nutrition:  po


Analgesia:  na


Anxiety/ delirium  xanax prn - not needed 


VTE Prophylaxis:  lovenox


Stress Ulcer Prophylaxis:  PPI


Glycemic Control:  





 


Plans in collaboration with bedside consultants and IM MDs. 


Discussed with RN to reach out if any questions or concerns 


A total of  33 minutes of critical care time was devoted to this patient today, 

required to treat and/or prevent further deterioration of critical care 

condition ( as above)











IRMA JOHNSON MD         Sep 28, 2021 10:53

## 2021-09-28 NOTE — PULMONARY PROGRESS NOTE
LIU BETANCUR MED STUDENT 9/28/21 1220:


Subjective


Date Seen by a Provider:  Sep 28, 2021


Time Seen by a Provider:  07:25


Subjective/Events-last exam


Elijah states no new pain. Complains of continued SOB. He is on bipap at 16/12 

and 30% oxygen. Night RN notes he has been educated multiple times on benefits 

of prone positioning but feels it is uncomfortable and does not prone.





Sepsis Event


Evaluation


Height, Weight, BMI


Height: '"


Weight: lbs. oz. kg; 40.35 BMI


Method:





Focused Exam


Time of Focused Exam:  07:00





Exam


Exam


Patient acknowledged, consented, and participated in this virtual visit which 

was conducted using real time audio/video


Vital Signs








  Date Time  Temp Pulse Resp B/P (MAP) Pulse Ox O2 Delivery O2 Flow Rate FiO2


 


9/28/21 11:00  56 19 97/57 (70) 97 Vapotherm 25.00 





       55.00 


 


9/28/21 10:39     92 Vapotherm 25.00 55


 


9/28/21 10:00  69 13 104/73 (83) 95 Vapotherm 25.00 





       55.00 


 


9/28/21 09:19      Vapotherm 25.00 





       55.00 


 


9/28/21 09:00  60 32 109/74 (86) 96 NIV Bilevel 50.00 


 


9/28/21 08:45     98 NIV Bilevel  50


 


9/28/21 08:00  61 18 116/82 (93) 96 NIV Bilevel 50.00 


 


9/28/21 08:00 35.6       


 


9/28/21 07:00  77 16 109/84 (92) 95 NIV Bilevel 50.00 


 


9/28/21 06:54  64 20  97  50.00 


 


9/28/21 06:35  59      


 


9/28/21 06:13      NIV Bilevel 50.00 


 


9/28/21 06:00  75 14 115/81 (92) 98 NIV Bilevel 50.00 


 


9/28/21 05:00  64 24 114/73 (84) 97 NIV Bilevel 50.00 


 


9/28/21 04:00     95 NIV Bilevel  50


 


9/28/21 04:00  49 26 102/62 (74) 98 NIV Bilevel 50.00 


 


9/28/21 03:00  64 24 97/62 (73) 98 NIV Bilevel 50.00 


 


9/28/21 03:00 36.1     NIV Bilevel 50.00 


 


9/28/21 02:00  59 23 118/79 (87) 98 NIV Bilevel 50.00 


 


9/28/21 01:59      NIV Bilevel 50.00 


 


9/28/21 01:55  58 26  95  50.00 


 


9/28/21 01:00  52 29 102/63 (74) 98 NIV Bilevel 60.00 


 


9/28/21 01:00  61      


 


9/28/21 00:00     96 NIV Bilevel  60


 


9/28/21 00:00  68 8 122/84 (94) 99 NIV Bilevel 60.00 


 


9/27/21 23:00 36.2     NIV Bilevel 60.00 


 


9/27/21 23:00  59  114/77 (89) 97 NIV Bilevel 60.00 


 


9/27/21 21:50     94 Vapotherm 30.00 70


 


9/27/21 21:00  61 30 122/77 (92) 96 Vapotherm 30.00 





       70.00 


 


9/27/21 20:00     93 Vapotherm 30.00 70


 


9/27/21 20:00  68 24 110/68 (78) 94 Vapotherm 30.00 





       70.00 


 


9/27/21 19:51 35.9       


 


9/27/21 19:00      Vapotherm 30.00 





       70.00 


 


9/27/21 19:00  75      


 


9/27/21 19:00  75 25 126/92 (104) 93 Vapotherm 30.00 





       70.00 


 


9/27/21 18:36     92 Vapotherm 30.00 70


 


9/27/21 18:00  63 30 134/83 (100) 97 Vapotherm 30.00 





       70.00 


 


9/27/21 17:00  94 18  85 Vapotherm 30.00 





       70.00 


 


9/27/21 16:00  79 22  93 Vapotherm 30.00 





       70.00 


 


9/27/21 15:32      Vapotherm 30.00 60


 


9/27/21 15:00  95 23 100/53 (69) 95 Vapotherm 30.00 





       70.00 


 


9/27/21 14:32     96 Vapotherm 35.00 70


 


9/27/21 14:00  68 27 126/77 (93) 95 Vapotherm 30.00 





       70.00 


 


9/27/21 13:00  62  117/88 (98) 95 Vapotherm 30.00 





       70.00 


 


9/27/21 12:30      Vapotherm 30.00 70


 


9/27/21 12:23  74      














I & O 


 


 9/28/21





 07:00


 


Intake Total 2262 ml


 


Output Total 1125 ml


 


Balance 1137 ml








Height & Weight


Height: '"


Weight: lbs. oz. kg; 40.35 BMI


Method:


General Appearance:  No Apparent Distress, WD/WN, Obese


HEENT:  PERRL/EOMI, Pharynx Normal, Moist Mucous Membranes


Neck:  Normal Inspection, Non Tender, Supple


Respiratory:  No Crackles; Decreased Breath Sounds; No Stridor, No Wheezing; 

Other (coarse breath sounds. On Bipap)


Cardiovascular:  Regular Rate, Rhythm, No Edema, No Gallop, No Murmur


Peripheral Pulses:  1+ Dorsalis Pedis (R), 1+ Left Dors-Pedis (L)


Gastrointestinal:  normal bowel sounds, non tender, soft


Extremity:  Normal Capillary Refill, Normal Inspection, Non Tender, No Calf 

Tenderness, No Pedal Edema


Neurologic/Psychiatric:  Alert, Oriented x3, No Motor/Sensory Deficits, Normal 

Mood/Affect


Skin:  Normal Color, Warm/Dry





Results


Lab


Laboratory Tests


9/27/21 03:35








9/28/21 06:00











Assessment/Plan


Assessment/Plan


Covid 19


ARDS


-on day 6 of decadron, albuterol inhalers, mucinex, robitussin


-Bipap 16/12 at 30% O2 overnight


-CXR stable compared to previous





borderline hyponatremia





Monitor for development of Covid PNA


-PCT low 0.01





GI prophylaxis


-pepcid





DVT/PE prophylaxis


-Lovenox 40





IRMA JOHNSON MD 9/30/21 0909:


Subjective


Date Seen by a Provider:  Sep 28, 2021





Supervisory-Addendum Brief


Verification & Attestation


Participated in pt care:  history, MDM, physical


Personally performed:  history, MDM, supervision of care


Care discussed with:  Medical Student


Procedures:  n/a


A medical student performed and documented this service. I reviewed all 

information documented by the medical student .  Medical student performed beatrice

ents physical exam . Medical decision making was done during tele-rounds with 

this medical student and a bedside RN . Please see my notes for details  

/clarification.











LIU BETANCUR MED STUDENT      Sep 28, 2021 12:20


IRMA JOHNSON MD         Sep 30, 2021 09:09

## 2021-09-28 NOTE — PROGRESS NOTE
Subjective


Subjective/Events-last exam


Patient states that he is feeling better. Tolerating PO diet.


Review of Systems


Pulmonary:  Dyspnea, Cough


Cardiovascular:  No: Chest Pain, Palpitations


Gastrointestinal:  No: Nausea, Vomiting, Abdominal Pain, Diarrhea, Constipation


Neurological:  Weakness, Incoordination





Focused Exam


Time of Focused Exam:  07:00





Objective


Exam


Last Set of Vital Signs





Vital Signs








  Date Time  Temp Pulse Resp B/P (MAP) Pulse Ox O2 Delivery O2 Flow Rate FiO2


 


9/28/21 21:34     95 Vapotherm 25.00 50


 


9/28/21 21:00  55 16     


 


9/28/21 20:47 35.8       





Capillary Refill :


I&O











Intake and Output 


 


 9/28/21





 00:00


 


Intake Total 2262 ml


 


Output Total 1525 ml


 


Balance 737 ml


 


 


 


Intake Oral 2262 ml


 


Output Urine Total 1525 ml


 


# Voids 1


 


# Bowel Movements 1








General:  Alert, Oriented X3, Cooperative, Mild Distress (with minimal activity)


Lungs:  Clear to Auscultation, Normal Air Movement


Heart:  Regular Rate, No Murmurs


Abdomen:  Normal Bowel Sounds, Soft, No Tenderness, No Masses


Extremities:  No Edema, No Tenderness/Swelling


Skin:  No Rashes, No Breakdown


Neuro:  Normal Speech, Strength at 5/5 X4 Ext, Sensation Intact, Cranial Nerves 

3-12 NL





Results/Procedures


Lab


Laboratory Tests


9/28/21 06:00: 


White Blood Count 8.1, Red Blood Count 5.43, Hemoglobin 15.2, Hematocrit 45, 

Mean Corpuscular Volume 84, Mean Corpuscular Hemoglobin 28, Mean Corpuscular 

Hemoglobin Concent 34, Red Cell Distribution Width 11.9, Platelet Count 356, 

Mean Platelet Volume 9.5, Immature Granulocyte % (Auto) 2, Neutrophils (%) 

(Auto) 57, Lymphocytes (%) (Auto) 27, Monocytes (%) (Auto) 8, Eosinophils (%) 

(Auto) 6, Basophils (%) (Auto) 0, Neutrophils # (Auto) 4.7, Lymphocytes # (Auto)

2.2, Monocytes # (Auto) 0.6, Eosinophils # (Auto) 0.5H, Basophils # (Auto) 0.0, 

Immature Granulocyte # (Auto) 0.2H, D-Dimer 0.37, Sodium Level 136, Potassium 

Level 4.2, Chloride Level 103, Carbon Dioxide Level 23, Anion Gap 10, Blood Urea

Nitrogen 22H, Creatinine 0.77, Estimat Glomerular Filtration Rate 121, 

BUN/Creatinine Ratio 29, Glucose Level 90, Calcium Level 9.1, Phosphorus Level 

4.1, Magnesium Level 2.4, Procalcitonin 0.01


9/28/21 11:36: Glucometer 97


9/28/21 17:23: Glucometer 108


9/28/21 19:53: Glucometer 124H





Assessment/Plan


Assessment/Plan





(1) Acute and chronic respiratory failure with hypoxia


Status:  Acute


Assessment & Plan:  9/27: Vapotherm dependent, FiO2 80%, discussed the importanc

e of IS and proning, will d/c espinal at patients request so he can prone, S/p 

Actrema, Dexamethasone, Tele ICU helping with ICU management


9/28: Will continue to titrate as tolerated, plan on transfer to Med/Surg in AM 





(2) Pneumonia due to COVID-19 virus


Status:  Acute


(3) BMI 39.0-39.9,adult


Status:  Chronic


(4) DVT prophylaxis


Status:  Acute


Assessment & Plan:  - VNII Caro MD               Sep 28, 2021 22:23

## 2021-09-28 NOTE — DIAGNOSTIC IMAGING REPORT
INDICATION:  COVID positive, hypoxia.  



TECHNIQUE:  Single view chest 6:45 AM.



CORRELATION STUDY:  09/22/2021



FINDINGS: 

Based on structures appearing generally stable.  Rather prominent

extensive patchy bilateral pulmonary infiltrates are again

demonstrated. Overall likely relatively stable depressed

minimally improved.



IMPRESSION: 

1. Bilateral pulmonary infiltrates stable to perhaps minimally

improved from prior.



Dictated by: 



  Dictated on workstation # [id number]

## 2021-09-29 VITALS — SYSTOLIC BLOOD PRESSURE: 134 MMHG | DIASTOLIC BLOOD PRESSURE: 77 MMHG

## 2021-09-29 VITALS — SYSTOLIC BLOOD PRESSURE: 110 MMHG | DIASTOLIC BLOOD PRESSURE: 75 MMHG

## 2021-09-29 VITALS — DIASTOLIC BLOOD PRESSURE: 70 MMHG | SYSTOLIC BLOOD PRESSURE: 120 MMHG

## 2021-09-29 VITALS — DIASTOLIC BLOOD PRESSURE: 67 MMHG | SYSTOLIC BLOOD PRESSURE: 111 MMHG

## 2021-09-29 VITALS — DIASTOLIC BLOOD PRESSURE: 72 MMHG | SYSTOLIC BLOOD PRESSURE: 138 MMHG

## 2021-09-29 VITALS — SYSTOLIC BLOOD PRESSURE: 106 MMHG | DIASTOLIC BLOOD PRESSURE: 54 MMHG

## 2021-09-29 VITALS — DIASTOLIC BLOOD PRESSURE: 53 MMHG | SYSTOLIC BLOOD PRESSURE: 103 MMHG

## 2021-09-29 VITALS — SYSTOLIC BLOOD PRESSURE: 131 MMHG | DIASTOLIC BLOOD PRESSURE: 78 MMHG

## 2021-09-29 VITALS — DIASTOLIC BLOOD PRESSURE: 75 MMHG | SYSTOLIC BLOOD PRESSURE: 113 MMHG

## 2021-09-29 VITALS — DIASTOLIC BLOOD PRESSURE: 79 MMHG | SYSTOLIC BLOOD PRESSURE: 133 MMHG

## 2021-09-29 VITALS — SYSTOLIC BLOOD PRESSURE: 105 MMHG | DIASTOLIC BLOOD PRESSURE: 67 MMHG

## 2021-09-29 VITALS — SYSTOLIC BLOOD PRESSURE: 117 MMHG | DIASTOLIC BLOOD PRESSURE: 78 MMHG

## 2021-09-29 VITALS — DIASTOLIC BLOOD PRESSURE: 74 MMHG | SYSTOLIC BLOOD PRESSURE: 124 MMHG

## 2021-09-29 VITALS — SYSTOLIC BLOOD PRESSURE: 129 MMHG | DIASTOLIC BLOOD PRESSURE: 85 MMHG

## 2021-09-29 VITALS — SYSTOLIC BLOOD PRESSURE: 120 MMHG | DIASTOLIC BLOOD PRESSURE: 70 MMHG

## 2021-09-29 VITALS — SYSTOLIC BLOOD PRESSURE: 114 MMHG | DIASTOLIC BLOOD PRESSURE: 74 MMHG

## 2021-09-29 VITALS — SYSTOLIC BLOOD PRESSURE: 116 MMHG | DIASTOLIC BLOOD PRESSURE: 69 MMHG

## 2021-09-29 LAB
BASOPHILS # BLD AUTO: 0 10^3/UL (ref 0–0.1)
BASOPHILS NFR BLD AUTO: 0 % (ref 0–10)
BUN/CREAT SERPL: 27
CALCIUM SERPL-MCNC: 9.1 MG/DL (ref 8.5–10.1)
CHLORIDE SERPL-SCNC: 106 MMOL/L (ref 98–107)
CO2 SERPL-SCNC: 24 MMOL/L (ref 21–32)
CREAT SERPL-MCNC: 0.79 MG/DL (ref 0.6–1.3)
EOSINOPHIL # BLD AUTO: 0.2 10^3/UL (ref 0–0.3)
EOSINOPHIL NFR BLD AUTO: 3 % (ref 0–10)
GFR SERPLBLD BASED ON 1.73 SQ M-ARVRAT: 118 ML/MIN
GLUCOSE SERPL-MCNC: 95 MG/DL (ref 70–105)
HCT VFR BLD CALC: 43 % (ref 40–54)
HGB BLD-MCNC: 14.6 G/DL (ref 13.3–17.7)
LYMPHOCYTES # BLD AUTO: 2.2 10^3/UL (ref 1–4)
LYMPHOCYTES NFR BLD AUTO: 29 % (ref 12–44)
MAGNESIUM SERPL-MCNC: 2.3 MG/DL (ref 1.6–2.4)
MANUAL DIFFERENTIAL PERFORMED BLD QL: NO
MCH RBC QN AUTO: 29 PG (ref 25–34)
MCHC RBC AUTO-ENTMCNC: 34 G/DL (ref 32–36)
MCV RBC AUTO: 85 FL (ref 80–99)
MONOCYTES # BLD AUTO: 0.8 10^3/UL (ref 0–1)
MONOCYTES NFR BLD AUTO: 10 % (ref 0–12)
NEUTROPHILS # BLD AUTO: 4.2 10^3/UL (ref 1.8–7.8)
NEUTROPHILS NFR BLD AUTO: 55 % (ref 42–75)
PHOSPHATE SERPL-MCNC: 4.3 MG/DL (ref 2.3–4.7)
PLATELET # BLD: 354 10^3/UL (ref 130–400)
PMV BLD AUTO: 9.6 FL (ref 9–12.2)
POTASSIUM SERPL-SCNC: 4.2 MMOL/L (ref 3.6–5)
SODIUM SERPL-SCNC: 141 MMOL/L (ref 135–145)
WBC # BLD AUTO: 7.6 10^3/UL (ref 4.3–11)

## 2021-09-29 RX ADMIN — FLUTICASONE PROPIONATE AND SALMETEROL SCH EACH: 113; 14 POWDER, METERED RESPIRATORY (INHALATION) at 07:08

## 2021-09-29 RX ADMIN — ALBUTEROL SULFATE SCH PUFF: 90 AEROSOL, METERED RESPIRATORY (INHALATION) at 07:07

## 2021-09-29 RX ADMIN — FLUTICASONE PROPIONATE AND SALMETEROL SCH EACH: 113; 14 POWDER, METERED RESPIRATORY (INHALATION) at 19:21

## 2021-09-29 RX ADMIN — FAMOTIDINE SCH MG: 20 TABLET, FILM COATED ORAL at 21:36

## 2021-09-29 RX ADMIN — POTASSIUM CHLORIDE SCH MEQ: 1500 TABLET, EXTENDED RELEASE ORAL at 05:58

## 2021-09-29 RX ADMIN — GUAIFENESIN SCH MG: 600 TABLET, EXTENDED RELEASE ORAL at 08:33

## 2021-09-29 RX ADMIN — ALBUTEROL SULFATE SCH PUFF: 90 AEROSOL, METERED RESPIRATORY (INHALATION) at 14:52

## 2021-09-29 RX ADMIN — POTASSIUM CHLORIDE SCH MLS/HR: 200 INJECTION, SOLUTION INTRAVENOUS at 05:58

## 2021-09-29 RX ADMIN — GUAIFENESIN SCH MG: 600 TABLET, EXTENDED RELEASE ORAL at 21:36

## 2021-09-29 RX ADMIN — INSULIN ASPART SCH UNIT: 100 INJECTION, SOLUTION INTRAVENOUS; SUBCUTANEOUS at 06:25

## 2021-09-29 RX ADMIN — POLYETHYLENE GLYCOL (3350) SCH GM: 17 POWDER, FOR SOLUTION ORAL at 21:35

## 2021-09-29 RX ADMIN — ENOXAPARIN SODIUM SCH MG: 100 INJECTION SUBCUTANEOUS at 18:14

## 2021-09-29 RX ADMIN — MELATONIN 3 MG ORAL TABLET SCH MG: 3 TABLET ORAL at 21:36

## 2021-09-29 RX ADMIN — ALBUTEROL SULFATE SCH PUFF: 90 AEROSOL, METERED RESPIRATORY (INHALATION) at 19:21

## 2021-09-29 RX ADMIN — POLYETHYLENE GLYCOL (3350) SCH GM: 17 POWDER, FOR SOLUTION ORAL at 08:00

## 2021-09-29 RX ADMIN — INSULIN ASPART SCH UNIT: 100 INJECTION, SOLUTION INTRAVENOUS; SUBCUTANEOUS at 10:39

## 2021-09-29 RX ADMIN — ALBUTEROL SULFATE SCH PUFF: 90 AEROSOL, METERED RESPIRATORY (INHALATION) at 09:51

## 2021-09-29 RX ADMIN — INSULIN ASPART SCH UNIT: 100 INJECTION, SOLUTION INTRAVENOUS; SUBCUTANEOUS at 16:03

## 2021-09-29 RX ADMIN — MAGNESIUM SULFATE IN DEXTROSE SCH MLS/HR: 10 INJECTION, SOLUTION INTRAVENOUS at 05:58

## 2021-09-29 RX ADMIN — ALBUTEROL SULFATE SCH PUFF: 90 AEROSOL, METERED RESPIRATORY (INHALATION) at 22:38

## 2021-09-29 RX ADMIN — INSULIN ASPART SCH UNIT: 100 INJECTION, SOLUTION INTRAVENOUS; SUBCUTANEOUS at 20:23

## 2021-09-29 RX ADMIN — DOCUSATE SODIUM AND SENNOSIDES SCH EA: 8.6; 5 TABLET, FILM COATED ORAL at 21:35

## 2021-09-29 RX ADMIN — DOCUSATE SODIUM AND SENNOSIDES SCH EA: 8.6; 5 TABLET, FILM COATED ORAL at 08:00

## 2021-09-29 RX ADMIN — ALBUTEROL SULFATE SCH PUFF: 90 AEROSOL, METERED RESPIRATORY (INHALATION) at 01:36

## 2021-09-29 RX ADMIN — FAMOTIDINE SCH MG: 20 TABLET, FILM COATED ORAL at 08:33

## 2021-09-29 NOTE — PROGRESS NOTE
Subjective


Subjective/Events-last exam


States that he is breathing better. Tolerating PO diet. Changing over to high 

flow NC this AM.


Review of Systems


Pulmonary:  No Dyspnea; Cough


Cardiovascular:  No: Chest Pain, Palpitations, Edema


Gastrointestinal:  No: Nausea, Vomiting, Abdominal Pain, Diarrhea, Constipation


Neurological:  Weakness, Incoordination





Focused Exam


Time of Focused Exam:  07:00





Objective


Exam


Last Set of Vital Signs





Vital Signs








  Date Time  Temp Pulse Resp B/P (MAP) Pulse Ox O2 Delivery O2 Flow Rate FiO2


 


9/29/21 19:58 36.8 68 18 134/77 (96) 96 High Flow N/C 3.00 


 


9/29/21 08:47        45





Capillary Refill :


I&O











Intake and Output 


 


 9/29/21





 00:00


 


Intake Total 1640 ml


 


Output Total 600 ml


 


Balance 1040 ml


 


 


 


Intake Oral 1640 ml


 


Output Urine Total 600 ml


 


# Voids 2


 


# Bowel Movements 1








General:  Alert, Oriented X3, Cooperative, Mild Distress


Lungs:  Clear to Auscultation, Normal Air Movement


Heart:  Regular Rate, No Murmurs


Abdomen:  Normal Bowel Sounds, Soft, No Tenderness, No Masses


Extremities:  No Edema, No Tenderness/Swelling


Neuro:  Normal Speech, Cranial Nerves 3-12 NL





Results/Procedures


Lab


Laboratory Tests


9/29/21 05:45: 


White Blood Count 7.6, Red Blood Count 5.13, Hemoglobin 14.6, Hematocrit 43, 

Mean Corpuscular Volume 85, Mean Corpuscular Hemoglobin 29, Mean Corpuscular 

Hemoglobin Concent 34, Red Cell Distribution Width 11.9, Platelet Count 354, 

Mean Platelet Volume 9.6, Immature Granulocyte % (Auto) 2, Neutrophils (%) (Au

to) 55, Lymphocytes (%) (Auto) 29, Monocytes (%) (Auto) 10, Eosinophils (%) 

(Auto) 3, Basophils (%) (Auto) 0, Neutrophils # (Auto) 4.2, Lymphocytes # (Auto)

2.2, Monocytes # (Auto) 0.8, Eosinophils # (Auto) 0.2, Basophils # (Auto) 0.0, 

Immature Granulocyte # (Auto) 0.2H, Sodium Level 141, Potassium Level 4.2, 

Chloride Level 106, Carbon Dioxide Level 24, Anion Gap 11, Blood Urea Nitrogen 

21H, Creatinine 0.79, Estimat Glomerular Filtration Rate 118, BUN/Creatinine Rat

io 27, Glucose Level 95, Calcium Level 9.1, Phosphorus Level 4.3, Magnesium 

Level 2.3


9/29/21 10:37: Glucometer 98


9/29/21 15:36: Glucometer 138H


9/29/21 20:20: Glucometer 94





Assessment/Plan


Assessment/Plan





(1) Acute and chronic respiratory failure with hypoxia


Status:  Acute


Assessment & Plan:  9/27: Vapotherm dependent, FiO2 80%, discussed the 

importance of IS and proning, will d/c espinal at patients request so he can 

prone, S/p Actrema, Dexamethasone, Tele ICU helping with ICU management


9/28: Will continue to titrate as tolerated, plan on transfer to Med/Surg in AM 


9/29: Transitioned to high flow and transferred to Med/Surg this AM.





(2) Pneumonia due to COVID-19 virus


Status:  Acute


(3) BMI 39.0-39.9,adult


Status:  Chronic


(4) DVT prophylaxis


Status:  Acute


Assessment & Plan:  - VINI Caro MD               Sep 29, 2021 20:58

## 2021-09-29 NOTE — TELE-ICU PROGRESS NOTE
Subjective


Date Seen by a Provider:  Sep 29, 2021


Time Seen by a Provider:  11:32


Subjective/Events-last exam


Now on FIO2 45%, 15 lpm high flow oxgyen, spont RR 24, SpO2 97%


Remains on Decadron IV, CXR from yesterday looks about same





Sepsis Event


Evaluation


Height, Weight, BMI


Height: '"


Weight: lbs. oz. kg; 40.35 BMI


Method:





Focused Exam


Time of Focused Exam:  07:00





Exam


Exam


Patient acknowledged, consented, and participated in this virtual visit which 

was conducted using real time audio/video


Vital Signs








  Date Time  Temp Pulse Resp B/P (MAP) Pulse Ox O2 Delivery O2 Flow Rate FiO2


 


9/29/21 08:47     95 Vapotherm 40.00 45


 


9/29/21 08:05 35.6       


 


9/29/21 07:08     94 Vapotherm 15.00 45


 


9/29/21 07:00      Vapotherm 15.00 





       45.00 


 


9/29/21 06:45  55      


 


9/29/21 06:00  54 24 117/78 (92) 94 NIV Bilevel 40.00 


 


9/29/21 05:00  54 28 111/67 (79) 95 NIV Bilevel 40.00 


 


9/29/21 04:00  48 14 113/75 (92) 97 NIV Bilevel 40.00 


 


9/29/21 04:00     98 NIV Bilevel  40


 


9/29/21 03:00  50 32 124/74 (88) 99 NIV Bilevel 40.00 


 


9/29/21 02:00  58 37 105/67 (79) 97 NIV Bilevel 40.00 


 


9/29/21 01:36  58 28  96  40.00 


 


9/29/21 01:32      NIV Bilevel 40.00 


 


9/29/21 01:00  57      


 


9/29/21 01:00  57 19 120/70 (82) 98 NIV Bilevel 50.00 


 


9/29/21 00:00     98 NIV Bilevel  50


 


9/29/21 00:00  59 25 106/54 (73) 98 NIV Bilevel 50.00 


 


9/29/21 00:00 36.4       


 


9/28/21 23:35  64 21  96 NIV Bilevel 50.00 


 


9/28/21 23:00  70 20 120/71 (87) 95 Vapotherm 20.00 





       50.00 


 


9/28/21 22:20  56 28 124/64 (84) 98 Vapotherm 20.00 





       50.00 


 


9/28/21 21:34     95 Vapotherm 25.00 50


 


9/28/21 21:00  55 16  92 Vapotherm 20.00 





       50.00 


 


9/28/21 20:47 35.8       


 


9/28/21 20:00     96 Vapotherm 20.00 50


 


9/28/21 20:00  59 27  98 Vapotherm 20.00 





       50.00 


 


9/28/21 19:00  82      


 


9/28/21 19:00  82 24  95 Vapotherm 20.00 





       50.00 


 


9/28/21 19:00      Vapotherm 20.00 





       50.00 


 


9/28/21 18:28     96 Vapotherm 25.00 55


 


9/28/21 18:00  82 22  94 Vapotherm 25.00 





       50.00 


 


9/28/21 17:15      Vapotherm 25.00 





       50.00 


 


9/28/21 17:00    123/84 (97) 94 Vapotherm 25.00 





       55.00 


 


9/28/21 16:15     96 Vapotherm 25.00 55


 


9/28/21 16:00     95 Vapotherm 25.00 





       55.00 


 


9/28/21 15:00     98 Vapotherm 25.00 





       55.00 


 


9/28/21 14:47     96 Vapotherm 25.00 55


 


9/28/21 14:00  61 29  97 Vapotherm 25.00 





       55.00 


 


9/28/21 13:00  62 14 111/71 (84) 98 Vapotherm 25.00 





       55.00 


 


9/28/21 13:00  50      


 


9/28/21 12:00  68 16 111/71 (84) 96 Vapotherm 25.00 





       55.00 


 


9/28/21 12:00     96 Vapotherm 25.00 55


 


9/28/21 12:00 35.6       


 


9/28/21 11:00  56 19 97/57 (70) 97 Vapotherm 25.00 





       55.00 


 


9/28/21 10:39     92 Vapotherm 25.00 55


 


9/28/21 10:00  69 13 104/73 (83) 95 Vapotherm 25.00 





       55.00 


 


9/28/21 09:19      Vapotherm 25.00 





       55.00 














I & O 


 


 9/29/21





 07:00


 


Intake Total 1530 ml


 


Output Total 600 ml


 


Balance 930 ml








Height & Weight


Height: '"


Weight: lbs. oz. kg; 40.35 BMI


Method:


General Appearance:  No Apparent Distress, WD/WN, Obese


HEENT:  PERRL/EOMI, Pharynx Normal, Moist Mucous Membranes


Neck:  Normal Inspection, Non Tender, Supple


Respiratory:  Lungs Clear; No Crackles; Decreased Breath Sounds; No Stridor, No 

Wheezing; Other (coarse breath sounds. On Bipap)


Cardiovascular:  Regular Rate, Rhythm, No Edema, No Gallop, No Murmur


Peripheral Pulses:  1+ Dorsalis Pedis (R), 1+ Left Dors-Pedis (L)


Gastrointestinal:  normal bowel sounds, non tender, soft


Extremity:  Normal Capillary Refill, Normal Inspection, Non Tender, No Calf 

Tenderness, No Pedal Edema


Neurologic/Psychiatric:  Alert, Oriented x3, No Motor/Sensory Deficits, Normal 

Mood/Affect


Skin:  Normal Color, Warm/Dry





Results


Lab


Laboratory Tests


9/28/21 06:00








9/29/21 05:45











Assessment/Plan


Assessment/Plan


A/P 





AHRF / ARDS due to severe COVID19 


-9/27 - vapotherm 25L 55%  , BIPAP 50 %  night - IMPROVING 


-prone position if able , hope for  compliance 


- conservative fluid strategy (aim for even or negative fluid balance 


Now on 3 lpm NC with SpO2 95%


Can go to general  medical floor








SARS-Coronavirus-2/COVID-19 PNA 


( Not vaccinated , Dx   9/22  ) 


-Steroid  IV -  started 9/23


-Hypercoagulable state  , DDIMER 0.66  on   9/25 >  lovenox ppx dose  40 q 24 , 

D dimer  9/28 WNL


- Actemra 9/23





monitor for superimposed bact PNA 


-PCT negative  9/25  and 9/28 , OFF abx  





Hyperglycemia, HB a1c 5.4 on admission 


- ISS , close f/up on steroids


Critical Care:  Critically Ill Patient


Time spent with patient (mins):  COREY LEWIS MD             Sep 29, 2021 09:05

## 2021-09-29 NOTE — PULMONARY PROGRESS NOTE
Subjective


Date Seen by a Provider:  Sep 29, 2021


Time Seen by a Provider:  07:20


Subjective/Events-last exam


Elijah is on vapotherm this morning at 25LPM + 50% FiO2. He states he is feeling 

less short of breath, "almost like a normal person". Denies fevers, chills, 

nausea, abdominal pain, constipation, diarrhea, leg pain. His cough is 

nonproductive. Denies chest pain.





Sepsis Event


Evaluation


Height, Weight, BMI


Height: '"


Weight: lbs. oz. kg; 40.35 BMI


Method:





Focused Exam


Time of Focused Exam:  07:00





Exam


Exam


Patient acknowledged, consented, and participated in this virtual visit which 

was conducted using real time audio/video


Vital Signs








  Date Time  Temp Pulse Resp B/P (MAP) Pulse Ox O2 Delivery O2 Flow Rate FiO2


 


9/29/21 12:09     94 High Flow N/C 3.00 


 


9/29/21 12:00 35.9       


 


9/29/21 11:00  71 30 110/75 (87) 92 Vapotherm 15.00 





       45.00 


 


9/29/21 10:00  72 36 131/78 (95) 93 Vapotherm 15.00 





       45.00 


 


9/29/21 09:51     96 High Flow N/C 3.00 


 


9/29/21 09:00  70 14 129/85 (100) 96 Vapotherm 15.00 





       45.00 


 


9/29/21 08:47     95 Vapotherm 40.00 45


 


9/29/21 08:05 35.6       


 


9/29/21 08:00  80 23 114/74 (87) 94 Vapotherm 15.00 





       45.00 


 


9/29/21 07:08     94 Vapotherm 15.00 45


 


9/29/21 07:00      Vapotherm 15.00 





       45.00 


 


9/29/21 07:00  57 23 116/69 (85) 96 Vapotherm 15.00 





       45.00 


 


9/29/21 06:45  55      


 


9/29/21 06:00  54 24 117/78 (92) 94 NIV Bilevel 40.00 


 


9/29/21 05:00  54 28 111/67 (79) 95 NIV Bilevel 40.00 


 


9/29/21 04:00  48 14 113/75 (92) 97 NIV Bilevel 40.00 


 


9/29/21 04:00     98 NIV Bilevel  40


 


9/29/21 03:00  50 32 124/74 (88) 99 NIV Bilevel 40.00 


 


9/29/21 02:00  58 37 105/67 (79) 97 NIV Bilevel 40.00 


 


9/29/21 01:36  58 28  96  40.00 


 


9/29/21 01:32      NIV Bilevel 40.00 


 


9/29/21 01:00  57      


 


9/29/21 01:00  57 19 120/70 (82) 98 NIV Bilevel 50.00 


 


9/29/21 00:00     98 NIV Bilevel  50


 


9/29/21 00:00  59 25 106/54 (73) 98 NIV Bilevel 50.00 


 


9/29/21 00:00 36.4       


 


9/28/21 23:35  64 21  96 NIV Bilevel 50.00 


 


9/28/21 23:00  70 20 120/71 (87) 95 Vapotherm 20.00 





       50.00 


 


9/28/21 22:20  56 28 124/64 (84) 98 Vapotherm 20.00 





       50.00 


 


9/28/21 21:34     95 Vapotherm 25.00 50


 


9/28/21 21:00  55 16  92 Vapotherm 20.00 





       50.00 


 


9/28/21 20:47 35.8       


 


9/28/21 20:00     96 Vapotherm 20.00 50


 


9/28/21 20:00  59 27  98 Vapotherm 20.00 





       50.00 


 


9/28/21 19:00  82      


 


9/28/21 19:00  82 24  95 Vapotherm 20.00 





       50.00 


 


9/28/21 19:00      Vapotherm 20.00 





       50.00 


 


9/28/21 18:28     96 Vapotherm 25.00 55


 


9/28/21 18:00  82 22  94 Vapotherm 25.00 





       50.00 


 


9/28/21 17:15      Vapotherm 25.00 





       50.00 


 


9/28/21 17:00    123/84 (97) 94 Vapotherm 25.00 





       55.00 


 


9/28/21 16:15     96 Vapotherm 25.00 55


 


9/28/21 16:00     95 Vapotherm 25.00 





       55.00 


 


9/28/21 15:00     98 Vapotherm 25.00 





       55.00 


 


9/28/21 14:47     96 Vapotherm 25.00 55


 


9/28/21 14:00  61 29  97 Vapotherm 25.00 





       55.00 


 


9/28/21 13:00  62 14 111/71 (84) 98 Vapotherm 25.00 





       55.00 


 


9/28/21 13:00  50      














I & O 


 


 9/29/21





 07:00


 


Intake Total 1530 ml


 


Output Total 600 ml


 


Balance 930 ml








Height & Weight


Height: '"


Weight: lbs. oz. kg; 40.35 BMI


Method:


General Appearance:  No Apparent Distress, WD/WN, Obese, Other (on vapotherm 25 

+ 50%)


HEENT:  PERRL/EOMI, Pharynx Normal, Moist Mucous Membranes


Neck:  Normal Inspection, Non Tender, Supple


Respiratory:  Lungs Clear; No Crackles; Decreased Breath Sounds; No Stridor, No 

Wheezing; Other (sounds improved compared to yesterday. on Vapotherm.)


Cardiovascular:  Regular Rate, Rhythm, No Edema, No Gallop, No Murmur


Peripheral Pulses:  1+ Dorsalis Pedis (R), 1+ Left Dors-Pedis (L)


Gastrointestinal:  normal bowel sounds, non tender, soft


Extremity:  Normal Capillary Refill, Normal Inspection, Non Tender, No Calf 

Tenderness, No Pedal Edema


Neurologic/Psychiatric:  Alert, Oriented x3, No Motor/Sensory Deficits, Normal 

Mood/Affect


Skin:  Normal Color, Warm/Dry





Results


Lab


Laboratory Tests


9/28/21 06:00








9/29/21 05:45











Assessment/Plan


Assessment/Plan


Covid-19 


ARDS


-on vapotherm this morning, was on bipap overnight. 


-respiratory status improving. 


-continue breathing treatments. Encouraged IS


-receiving Decadron 6mg since 9/22; consider tapering soon





hypotension, mild


-continue to monitor





labs reassuring today, no acute abnormalities.





DVT prophylaxis


-lovenox





IV access


-right upper arm peripheral IV











LIU BETANCUR MED STUDENT      Sep 29, 2021 12:34

## 2021-09-30 VITALS — SYSTOLIC BLOOD PRESSURE: 103 MMHG | DIASTOLIC BLOOD PRESSURE: 53 MMHG

## 2021-09-30 VITALS — SYSTOLIC BLOOD PRESSURE: 119 MMHG | DIASTOLIC BLOOD PRESSURE: 75 MMHG

## 2021-09-30 VITALS — DIASTOLIC BLOOD PRESSURE: 70 MMHG | SYSTOLIC BLOOD PRESSURE: 112 MMHG

## 2021-09-30 VITALS — DIASTOLIC BLOOD PRESSURE: 62 MMHG | SYSTOLIC BLOOD PRESSURE: 111 MMHG

## 2021-09-30 LAB
BASOPHILS # BLD AUTO: 0 10^3/UL (ref 0–0.1)
BASOPHILS NFR BLD AUTO: 0 % (ref 0–10)
BUN/CREAT SERPL: 26
CALCIUM SERPL-MCNC: 9 MG/DL (ref 8.5–10.1)
CHLORIDE SERPL-SCNC: 105 MMOL/L (ref 98–107)
CO2 SERPL-SCNC: 21 MMOL/L (ref 21–32)
CREAT SERPL-MCNC: 0.76 MG/DL (ref 0.6–1.3)
EOSINOPHIL # BLD AUTO: 0.1 10^3/UL (ref 0–0.3)
EOSINOPHIL NFR BLD AUTO: 2 % (ref 0–10)
GFR SERPLBLD BASED ON 1.73 SQ M-ARVRAT: 123 ML/MIN
GLUCOSE SERPL-MCNC: 94 MG/DL (ref 70–105)
HCT VFR BLD CALC: 44 % (ref 40–54)
HGB BLD-MCNC: 14.6 G/DL (ref 13.3–17.7)
LYMPHOCYTES # BLD AUTO: 3.2 10^3/UL (ref 1–4)
LYMPHOCYTES NFR BLD AUTO: 41 % (ref 12–44)
MAGNESIUM SERPL-MCNC: 2.2 MG/DL (ref 1.6–2.4)
MANUAL DIFFERENTIAL PERFORMED BLD QL: NO
MCH RBC QN AUTO: 28 PG (ref 25–34)
MCHC RBC AUTO-ENTMCNC: 33 G/DL (ref 32–36)
MCV RBC AUTO: 84 FL (ref 80–99)
MONOCYTES # BLD AUTO: 0.8 10^3/UL (ref 0–1)
MONOCYTES NFR BLD AUTO: 10 % (ref 0–12)
NEUTROPHILS # BLD AUTO: 3.5 10^3/UL (ref 1.8–7.8)
NEUTROPHILS NFR BLD AUTO: 45 % (ref 42–75)
PHOSPHATE SERPL-MCNC: 4.9 MG/DL (ref 2.3–4.7)
PLATELET # BLD: 387 10^3/UL (ref 130–400)
PMV BLD AUTO: 9.8 FL (ref 9–12.2)
POTASSIUM SERPL-SCNC: 3.7 MMOL/L (ref 3.6–5)
SODIUM SERPL-SCNC: 137 MMOL/L (ref 135–145)
WBC # BLD AUTO: 7.8 10^3/UL (ref 4.3–11)

## 2021-09-30 RX ADMIN — MAGNESIUM SULFATE IN DEXTROSE SCH MLS/HR: 10 INJECTION, SOLUTION INTRAVENOUS at 06:13

## 2021-09-30 RX ADMIN — POTASSIUM CHLORIDE SCH MLS/HR: 200 INJECTION, SOLUTION INTRAVENOUS at 06:13

## 2021-09-30 RX ADMIN — INSULIN ASPART SCH UNIT: 100 INJECTION, SOLUTION INTRAVENOUS; SUBCUTANEOUS at 11:08

## 2021-09-30 RX ADMIN — GUAIFENESIN SCH MG: 600 TABLET, EXTENDED RELEASE ORAL at 08:34

## 2021-09-30 RX ADMIN — POTASSIUM CHLORIDE SCH MEQ: 1500 TABLET, EXTENDED RELEASE ORAL at 06:14

## 2021-09-30 RX ADMIN — ALBUTEROL SULFATE SCH PUFF: 90 AEROSOL, METERED RESPIRATORY (INHALATION) at 08:00

## 2021-09-30 RX ADMIN — ALBUTEROL SULFATE SCH PUFF: 90 AEROSOL, METERED RESPIRATORY (INHALATION) at 02:13

## 2021-09-30 RX ADMIN — FLUTICASONE PROPIONATE AND SALMETEROL SCH EACH: 113; 14 POWDER, METERED RESPIRATORY (INHALATION) at 08:00

## 2021-09-30 RX ADMIN — FAMOTIDINE SCH MG: 20 TABLET, FILM COATED ORAL at 08:34

## 2021-09-30 RX ADMIN — ALBUTEROL SULFATE SCH PUFF: 90 AEROSOL, METERED RESPIRATORY (INHALATION) at 11:03

## 2021-09-30 RX ADMIN — POLYETHYLENE GLYCOL (3350) SCH GM: 17 POWDER, FOR SOLUTION ORAL at 08:34

## 2021-09-30 RX ADMIN — DOCUSATE SODIUM AND SENNOSIDES SCH EA: 8.6; 5 TABLET, FILM COATED ORAL at 08:34

## 2021-09-30 RX ADMIN — INSULIN ASPART SCH UNIT: 100 INJECTION, SOLUTION INTRAVENOUS; SUBCUTANEOUS at 05:54

## 2021-09-30 NOTE — DISCHARGE SUMMARY
Diagnosis/Chief Complaint


Date of Admission


Sep 22, 2021 at 16:21


Date of Discharge





Discharge Diagnosis


Problems/Diagnosis:  


(1) Acute and chronic respiratory failure with hypoxia


Assessment & Plan:  9/27: Vapotherm dependent, FiO2 80%, discussed the 

importance of IS and proning, will d/c espinal at patients request so he can 

prone, S/p Actrema, Dexamethasone, Tele ICU helping with ICU management


9/28: Will continue to titrate as tolerated, plan on transfer to Med/Surg in AM 


9/29: Transitioned to high flow and transferred to Med/Surg this AM.


Status:  Acute


(2) Pneumonia due to COVID-19 virus


Status:  Acute


(3) BMI 39.0-39.9,adult


Status:  Chronic


(4) DVT prophylaxis


Assessment & Plan:  - Lovenox


Status:  Acute





Discharge Summary-Simple/Stand


Consultations





Discharge Physical Examination


Allergies:  


Coded Allergies:  


     No Known Drug Allergies (Unverified , 9/19/21)


Vitals & I&Os





Vital Sign - Last 12Hours








  Date Time  Temp Pulse Resp B/P (MAP) Pulse Ox O2 Delivery O2 Flow Rate FiO2


 


9/30/21 13:04 36.3 81 18 112/70 (84) 91 Room Air  


 


9/30/21 08:00       1.00 


 


9/29/21 08:47        45














Intake and Output 


 


 9/30/21





 00:00


 


Intake Total 1200 ml


 


Balance 1200 ml











Hospital Course


See final discharge diagnosis.





Discharge


Instructions to patient/family


Please see electronic discharge instructions given to patient.


Discharge Medications


Reviewed and agree with Discharge Medication list on patient's Discharge 

Instruction sheet











VINI MELTON MD               Sep 30, 2021 13:13

## 2021-09-30 NOTE — DISCHARGE SUMMARY
Discharge [facility]


Reconcile Patient Problems


Problems Reviewed?:  Yes





Discharge Medications


New, Converted or Re-Newed RX:  Other ( No new meds)


Continued Medications:  


Acetaminophen (Tylenol Extra Strength) 500 Mg Tablet


1000 MG PO Q8H PRN for PAIN-MILD (1-4), TAB





Acetaminophen with Codeine (Acetaminophen-Cod #3 Tablet) 1 Each Tablet


1 EA PO Q8H PRN for PAIN-MODERATE (5-7), TAB





Budesonide (Budesonide) 0.5 Mg/2 Ml Ampul.neb


0.5 MG NEB BID PRN for WHEEZING, EA


USE AFTER ALBUTEROL TREATMENT


Dexamethasone (Dexamethasone) 4 Mg Tablet


MG PO DAILY, TAB


FILLED 09- #8/10 DAY SUPPLY


 1 TAB DAILY X 5 DAYS THEN  TAB DAILY X 5 DAYS


Fenofibrate Nanocrystallized (Fenofibrate) 145 Mg Tablet


145 MG PO DAILY, TAB


FILLED 09- #10/10 DAY SUPPLY


Fluticasone Propionate (Fluticasone Propionate) 16 Gm [place].susp


2 SPRAYS NSEACH DAILY, EA





Ipratropium/Albuterol Sulfate (Iprat-Albut 0.5-3(2.5) mg/3 ml) 3 Ml Ampul.neb


3 ML IH TID PRN for SHORTNESS OF BREATH, EACH





Loratadine (Loratadine) 10 Mg Tablet


10 MG PO DAILY, TAB





Ondansetron (Ondansetron Odt) 4 Mg Tab.rapdis


4 MG PO Q6H PRN for NAUSEA/VOMITING-1ST LINE, TAB





 


Discontinued Medications:  


Amoxicillin/Potassium Clav (Amox Tr-K Clv 875-125 mg Tab) 1 Each Tablet


1 EA PO BID, TAB


FILLED 09- #20/10 DAY SUPPLY


Ascorbic Acid (Vitamin C) 500 Mg Tablet


500 MG PO DAILY, TAB





Azithromycin (Azithromycin) 250 Mg Tablet


250 MG PO DAILY, TAB


FILLED 09- #6/5 DAY SUPPLY


Cholecalciferol (Vitamin D3) (Vitamin D3) 50 Mcg Tablet


50 MCG PO DAILY, TAB





Ivermectin (Ivermectin) 3 Mg Tablet


6 MG PO BID, TAB


TAKES 2 (3MG) TABS


 FILLED 09- #20/5 DAY SUPPLY


Zinc (Zinc) 50 Mg Tablet


50 MG PO DAILY, TAB











Patient Instructions


Goal/Follow Up Appt:  


[facility] Dr Orlando


Patient Instructions:  


Will need workup for VERNA after recovered





Continue to use oxygen with ambulation





Activity & Diet


Discharge Diet:  No Restrictions


Activity as Tolerated:  Yes











VINI MELTON MD               Sep 30, 2021 13:20

## 2022-09-23 ENCOUNTER — HOSPITAL ENCOUNTER (OUTPATIENT)
Dept: HOSPITAL 75 - ER FS | Age: 27
Setting detail: OBSERVATION
LOS: 1 days | Discharge: HOME | End: 2022-09-24
Attending: FAMILY MEDICINE | Admitting: FAMILY MEDICINE
Payer: COMMERCIAL

## 2022-09-23 VITALS — WEIGHT: 302.69 LBS | HEIGHT: 70.98 IN | BODY MASS INDEX: 42.38 KG/M2

## 2022-09-23 DIAGNOSIS — K52.9: Primary | ICD-10-CM

## 2022-09-23 DIAGNOSIS — Z79.899: ICD-10-CM

## 2022-09-23 DIAGNOSIS — F17.210: ICD-10-CM

## 2022-09-23 DIAGNOSIS — I10: ICD-10-CM

## 2022-09-23 LAB
ALBUMIN SERPL-MCNC: 4.4 GM/DL (ref 3.2–4.5)
ALP SERPL-CCNC: 119 U/L (ref 40–136)
ALT SERPL-CCNC: 64 U/L (ref 0–55)
APTT BLD: 31 SEC (ref 24–35)
BASOPHILS # BLD AUTO: 0 10^3/UL (ref 0–0.1)
BASOPHILS NFR BLD AUTO: 1 % (ref 0–10)
BILIRUB SERPL-MCNC: 0.5 MG/DL (ref 0.1–1)
BUN/CREAT SERPL: 11
CALCIUM SERPL-MCNC: 9.6 MG/DL (ref 8.5–10.1)
CHLORIDE SERPL-SCNC: 100 MMOL/L (ref 98–107)
CO2 SERPL-SCNC: 23 MMOL/L (ref 21–32)
CREAT SERPL-MCNC: 1.09 MG/DL (ref 0.6–1.3)
EOSINOPHIL # BLD AUTO: 0.1 10^3/UL (ref 0–0.3)
EOSINOPHIL NFR BLD AUTO: 1 % (ref 0–10)
GFR SERPLBLD BASED ON 1.73 SQ M-ARVRAT: 95 ML/MIN
GLUCOSE SERPL-MCNC: 97 MG/DL (ref 70–105)
HCT VFR BLD CALC: 43 % (ref 40–54)
HGB BLD-MCNC: 15.3 G/DL (ref 13.3–17.7)
INR PPP: 0.9 (ref 0.8–1.4)
LIPASE SERPL-CCNC: 113 U/L (ref 8–78)
LYMPHOCYTES # BLD AUTO: 3.1 10^3/UL (ref 1–4)
LYMPHOCYTES NFR BLD AUTO: 36 % (ref 12–44)
MANUAL DIFFERENTIAL PERFORMED BLD QL: NO
MCH RBC QN AUTO: 28 PG (ref 25–34)
MCHC RBC AUTO-ENTMCNC: 36 G/DL (ref 32–36)
MCV RBC AUTO: 79 FL (ref 80–99)
MONOCYTES # BLD AUTO: 1.1 10^3/UL (ref 0–1)
MONOCYTES NFR BLD AUTO: 14 % (ref 0–12)
NEUTROPHILS # BLD AUTO: 4.2 10^3/UL (ref 1.8–7.8)
NEUTROPHILS NFR BLD AUTO: 49 % (ref 42–75)
PLATELET # BLD: 266 10^3/UL (ref 130–400)
PMV BLD AUTO: 9.8 FL (ref 9–12.2)
POTASSIUM SERPL-SCNC: 3.5 MMOL/L (ref 3.6–5)
PROT SERPL-MCNC: 8.1 GM/DL (ref 6.4–8.2)
PROTHROMBIN TIME: 12.9 SEC (ref 12.2–14.7)
SODIUM SERPL-SCNC: 138 MMOL/L (ref 135–145)
WBC # BLD AUTO: 8.5 10^3/UL (ref 4.3–11)

## 2022-09-23 PROCEDURE — 85730 THROMBOPLASTIN TIME PARTIAL: CPT

## 2022-09-23 PROCEDURE — 83690 ASSAY OF LIPASE: CPT

## 2022-09-23 PROCEDURE — 85025 COMPLETE CBC W/AUTO DIFF WBC: CPT

## 2022-09-23 PROCEDURE — 96372 THER/PROPH/DIAG INJ SC/IM: CPT

## 2022-09-23 PROCEDURE — 96374 THER/PROPH/DIAG INJ IV PUSH: CPT

## 2022-09-23 PROCEDURE — 87046 STOOL CULTR AEROBIC BACT EA: CPT

## 2022-09-23 PROCEDURE — 96375 TX/PRO/DX INJ NEW DRUG ADDON: CPT

## 2022-09-23 PROCEDURE — 87077 CULTURE AEROBIC IDENTIFY: CPT

## 2022-09-23 PROCEDURE — 74177 CT ABD & PELVIS W/CONTRAST: CPT

## 2022-09-23 PROCEDURE — 36415 COLL VENOUS BLD VENIPUNCTURE: CPT

## 2022-09-23 PROCEDURE — 87328 CRYPTOSPORIDIUM AG IA: CPT

## 2022-09-23 PROCEDURE — 87899 AGENT NOS ASSAY W/OPTIC: CPT

## 2022-09-23 PROCEDURE — 87015 SPECIMEN INFECT AGNT CONCNTJ: CPT

## 2022-09-23 PROCEDURE — 99283 EMERGENCY DEPT VISIT LOW MDM: CPT

## 2022-09-23 PROCEDURE — 87449 NOS EACH ORGANISM AG IA: CPT

## 2022-09-23 PROCEDURE — 80048 BASIC METABOLIC PNL TOTAL CA: CPT

## 2022-09-23 PROCEDURE — 96376 TX/PRO/DX INJ SAME DRUG ADON: CPT

## 2022-09-23 PROCEDURE — 87177 OVA AND PARASITES SMEARS: CPT

## 2022-09-23 PROCEDURE — 85610 PROTHROMBIN TIME: CPT

## 2022-09-23 PROCEDURE — 87045 FECES CULTURE AEROBIC BACT: CPT

## 2022-09-23 PROCEDURE — 87324 CLOSTRIDIUM AG IA: CPT

## 2022-09-23 PROCEDURE — 96361 HYDRATE IV INFUSION ADD-ON: CPT

## 2022-09-23 PROCEDURE — 80053 COMPREHEN METABOLIC PANEL: CPT

## 2022-09-23 PROCEDURE — 87329 GIARDIA AG IA: CPT

## 2022-09-23 PROCEDURE — 83605 ASSAY OF LACTIC ACID: CPT

## 2022-09-23 NOTE — ED GI
General


Stated Complaint:  ABD PAIN


Source of Information:  Patient, Spouse





History of Present Illness


Date Seen by Provider:  Sep 23, 2022


Time Seen by Provider:  21:25


Initial Comments


26-year-old male presenting with complaints of 3 to 4 days of nausea, diffuse 

abdominal pain, multiple episodes of diarrhea and worsening pain to the left 

side of his abdomen in the last few hours.  He states that this started while 

they were on vacation in [place].  He just arrived back in the [place] at 1 AM.  

He has been taking Pepto-Bismol and Imodium.  His wife gave him a dose of Zofran

yesterday that she had.  He had cold sweats yesterday and they thought he had a 

fever but never took his temperature.  He has a history of migraine headaches 

and high blood pressure.  He denies having prior abdominal symptoms like this.  

He has seen some blood in his stool intermittently.


Timing/Duration:  3-4 Days


Severity/Quality:  Severe, Cramping


Location:  Generalized Abdomen (Generalized over the last 3 to 4 days but worse 

on the left side in the last couple of hours)


Radiation:  No Radiation


Activities at Onset:  None


Modifying Factors:  Worsens With Eating, Worsens With Movement, Worsens With 

Palpation


Associated Symptoms:  No Back Pain, No Chest Pain; Diaphoresis (Reports having 

cold sweats yesterday); No Fever/Chills, No Fatigue, No Headache, No Heartburn; 

Nausea/Vomiting; No Rash, No Shortness of Air, No Swelling/Mass in Abdomen, No 

Syncope, No Weakness





Allergies and Home Medications


Allergies


Coded Allergies:  


     No Known Drug Allergies (Unverified , 21)





Patient Home Medication List


Home Medication List Reviewed:  Yes


Acetaminophen (Tylenol Extra Strength) 500 Mg Tablet, 1,000 MG PO Q8H PRN for 

PAIN-MILD (1-4), (Reported)


   Entered as Reported by: REX FUNEZ on 21 1007


Acetaminophen with Codeine (Acetaminophen-Cod #3 Tablet) 1 Each Tablet, 1 EA PO 

Q8H PRN for PAIN-MODERATE (5-7), (Reported)


   Entered as Reported by: REX FUNEZ on 21 1007


Budesonide (Budesonide) 0.5 Mg/2 Ml Ampul.neb, 0.5 MG NEB BID PRN for WHEEZING, 

(Reported)


   Entered as Reported by: REX FUNEZ on 21


Dexamethasone (Dexamethasone) 4 Mg Tablet, MG PO DAILY, (Reported)


   Entered as Reported by: REX FUNEZ on 21


Fenofibrate Nanocrystallized (Fenofibrate) 145 Mg Tablet, 145 MG PO DAILY, 

(Reported)


   Entered as Reported by: REX FUNEZ on 21


Fluticasone Propionate (Fluticasone Propionate) 16 Gm [place].susp, 2 SPRAYS 

NSEACH DAILY, (Reported)


   Entered as Reported by: REX FUNEZ on 21


Ipratropium/Albuterol Sulfate (Iprat-Albut 0.5-3(2.5) mg/3 ml) 3 Ml Ampul.neb, 3

ML IH TID PRN for SHORTNESS OF BREATH, (Reported)


   Entered as Reported by: REX FUNEZ on 21


Loratadine (Loratadine) 10 Mg Tablet, 10 MG PO DAILY, (Reported)


   Entered as Reported by: REX FUNEZ on 21


Ondansetron (Ondansetron Odt) 4 Mg Tab.rapdis, 4 MG PO Q6H PRN for 

NAUSEA/VOMITING-1ST LINE, (Reported)


   Entered as Reported by: REX FUNEZ on 21





Review of Systems


Review of Systems


Constitutional:  see HPI


EENTM:  No Symptoms Reported


Respiratory:  No Symptoms Reported


Cardiovascular:  No Symptoms Reported


Gastrointestinal:  See HPI


Genitourinary:  Denies Hematuria, Denies Pain


Musculoskeletal:  no symptoms reported


Skin:  No rash


Psychiatric/Neurological:  No Symptoms Reported


Endocrine:  No Symptoms Reported


Hematologic/Lymphatic:  No Symptoms Reported





Past Medical-Social-Family Hx


Past Medical History


Surgery/Hospitalization HX:  


Hypertension, migraines


Surgeries:  No


Respiratory:  No


Cardiac:  No


High Cholesterol


Neurological:  No


Genitourinary:  No


Gastrointestinal:  No


Musculoskeletal:  No


Endocrine:  No


HEENT:  No


Cancer:  No


Psychosocial:  No





Physical Exam


Vital Signs





Vital Signs - First Documented








 22





 21:20


 


Temp 36.4


 


Pulse 84


 


Resp 16


 


B/P (MAP) 167/100 (122)


 


Pulse Ox 98


 


O2 Delivery Room Air





Capillary Refill :


Height/Weight/BMI


Height: '"


Weight: lbs. oz. kg; 40.35 BMI


Method:


General Appearance:  WD/WN, moderate distress


HEENT:  PERRL/EOMI, pharynx normal


Neck:  non-tender, full range of motion, supple, normal inspection


Respiratory:  chest non-tender, lungs clear, normal breath sounds, no 

respiratory distress, no accessory muscle use


Cardiovascular:  normal peripheral pulses, regular rate, rhythm


Gastrointestinal:  soft, no pulsatile mass, abnormal bowel sounds (Hyperactive);

No distended; guarding, rebound, tenderness (Diffuse tenderness but worse on the

left side)


Rectal:  deferred


Extremities:  normal range of motion, non-tender, normal capillary refill


Neurologic/Psychiatric:  alert, oriented x 3


Skin:  normal color, warm/dry; No rash





Focused Exam


Lactate Level


22 22:26: Lactic Acid Level 1.28





Lactic Acid Level





Laboratory Tests








Test


 22


22:26


 


Lactic Acid Level


 1.28 MMOL/L


(0.50-2.00)











Progress/Results/Core Measures


Results/Orders


Lab Results





Laboratory Tests








Test


 22


21:51 22


22:26 Range/Units


 


 


White Blood Count


 8.5 


 


 4.3-11.0


10^3/uL


 


Red Blood Count


 5.41 


 


 4.30-5.52


10^6/uL


 


Hemoglobin 15.3   13.3-17.7  g/dL


 


Hematocrit 43   40-54  %


 


Mean Corpuscular Volume 79 L  80-99  fL


 


Mean Corpuscular Hemoglobin 28   25-34  pg


 


Mean Corpuscular Hemoglobin


Concent 36 


 


 32-36  g/dL





 


Red Cell Distribution Width 12.7   10.0-14.5  %


 


Platelet Count


 266 


 


 130-400


10^3/uL


 


Mean Platelet Volume 9.8   9.0-12.2  fL


 


Immature Granulocyte % (Auto) 0    %


 


Neutrophils (%) (Auto) 49   42-75  %


 


Lymphocytes (%) (Auto) 36   12-44  %


 


Monocytes (%) (Auto) 14 H  0-12  %


 


Eosinophils (%) (Auto) 1   0-10  %


 


Basophils (%) (Auto) 1   0-10  %


 


Neutrophils # (Auto)


 4.2 


 


 1.8-7.8


10^3/uL


 


Lymphocytes # (Auto)


 3.1 


 


 1.0-4.0


10^3/uL


 


Monocytes # (Auto)


 1.1 H


 


 0.0-1.0


10^3/uL


 


Eosinophils # (Auto)


 0.1 


 


 0.0-0.3


10^3/uL


 


Basophils # (Auto)


 0.0 


 


 0.0-0.1


10^3/uL


 


Immature Granulocyte # (Auto)


 0.0 


 


 0.0-0.1


10^3/uL


 


Prothrombin Time 12.9   12.2-14.7  SEC


 


INR Comment 0.9   0.8-1.4  


 


Activated Partial


Thromboplast Time 31 


 


 24-35  SEC





 


Sodium Level 138   135-145  MMOL/L


 


Potassium Level 3.5 L  3.6-5.0  MMOL/L


 


Chloride Level 100     MMOL/L


 


Carbon Dioxide Level 23   21-32  MMOL/L


 


Anion Gap 15 H  5-14  MMOL/L


 


Blood Urea Nitrogen 12   7-18  MG/DL


 


Creatinine


 1.09 


 


 0.60-1.30


MG/DL


 


Estimat Glomerular Filtration


Rate 95 


 


  





 


BUN/Creatinine Ratio 11    


 


Glucose Level 97     MG/DL


 


Calcium Level 9.6   8.5-10.1  MG/DL


 


Corrected Calcium 9.3   8.5-10.1  MG/DL


 


Total Bilirubin 0.5   0.1-1.0  MG/DL


 


Aspartate Amino Transf


(AST/SGOT) 42 H


 


 5-34  U/L





 


Alanine Aminotransferase


(ALT/SGPT) 64 H


 


 0-55  U/L





 


Alkaline Phosphatase 119     U/L


 


Total Protein 8.1   6.4-8.2  GM/DL


 


Albumin 4.4   3.2-4.5  GM/DL


 


Lipase 113 H  8-78  U/L


 


Lactic Acid Level


 


 1.28 


 0.50-2.00


MMOL/L








My Orders





Orders - REBECCA HUFF MD


Comprehensive Metabolic Panel (22 21:31)


Lipase (22 21:31)


Ua Culture If Indicated (22 21:31)


Ed Iv/Invasive Line Start (22 21:31)


Cbc With Automated Diff (22 21:31)


Ct Abdomen/Pelvis W (22 21:31)


Lactic Acid Analyzer (22 21:31)


Protime With Inr (22 21:31)


Partial Thromboplastin Time (22 21:31)


Covid 19 Inhouse Test (22 21:31)


Ns Iv 1000 Ml (Sodium Chloride 0.9%) (22 21:34)


Ondansetron Injection (Zofran Injectio (22 21:34)


Dicyclomine Injection (Bentyl Injection) (22 21:34)


Ketorolac Injection (Toradol Injection) (22 21:34)


Pantoprazole Injection (Protonix Injecti (22 21:34)


Iohexol Injection (Omnipaque 350 Mg/Ml 1 (22 21:45)


Received Contrast (Hold Metformin- Contr (22 21:45)


Ns (Ivpb) (Sodium Chloride 0.9% Ivpb Bag (22 21:45)


Stool Culture (22 21:35)


Fecal Wbc (22 21:35)


C Difficile Ag + Toxin A/B. (22 21:35)


Parasite Scrn Stool Giard Cryp (22 21:35)


Parasite Complete Exam Stool (22 21:35)


Isolation Central Supply Req (22 21:35)


Azithromycin  Tablet (Zithromax  Tablet) (22 22:47)





Medications Given in ED





Current Medications








 Medications  Dose


 Ordered  Sig/Afia


 Route  Start Time


 Stop Time Status Last Admin


Dose Admin


 


 Iohexol  100 ml  ONCE  ONCE


 IV  22 21:45


 22 21:46 DC 22 21:59


100 ML


 


 Sodium Chloride  100 ml  ONCE  ONCE


 IV  22 21:45


 22 21:46 DC 22 21:59


100 ML








Vital Signs/I&O











 22





 21:20 23:11


 


Temp 36.4 


 


Pulse 84 91


 


Resp 16 16


 


B/P (MAP) 167/100 (122) 143/76


 


Pulse Ox 98 98


 


O2 Delivery Room Air Room Air











Progress


Progress Note #1:  


Progress Note


Order basic labs as well as urinalysis and if he has diarrhea here we will send 

it for stool studies.  Give normal saline 1 L IV fluid bolus for hydration, 

Zofran 4 mg IV for nausea, Bentyl 20 mg IM for abdominal cramping and spasm, 

Toradol 30 mg IV for pain, Protonix 40 mg IV for gastritis and epigastric pain. 

CT scan of the abdomen and pelvis with IV contrast to help evaluate for possible

colitis, diverticulitis, abdominal mass, cholecystitis, pyelonephritis.  COVID 

swab to help check for COVID as a potential source of his symptoms.


Progress Note #2:  


   Time:  22:27


Progress Note


 CBC stable without elevation of the white blood cell count.  He had mild 

elevation of monocytes to 14%.  His chemistry panel did not show any acute 

significant abnormality.  He did have mild elevation of his AST, ALT, lipase.  

These were all just slightly above normal range.  His coags were normal.  

Awaiting CT scan.


Progress Note #3:  


   Time:  22:42


Progress Note


CT scan shows diffuse colitis without abscess or perforation.  His pain was 

improved with medication but still had intermittent waves of more severe pain.  

With his recent travel to [place] where he had his symptoms start I reviewed the

[facility] travel website as well as up-to-date reference.  Based on reviewing 

information on the sites will treat with azithromycin for possible traveler's 

diarrhea.  Stool cultures are pending.  Discussed with Dr. Dunn for the 

hospitalist service and she accepted patient for observation admission for 

continued fluids, initiate antibiotics, continued pain and nausea control.





Diagnostic Imaging





   Diagonstic Imaging:  CT


   Plain Films/CT/US/NM/MRI:  abdomen, pelvis


Comments


                 [facility].


                                [place], [place]





NAME:   MARGARITA MORALES


[facility] REC#:   [id number]


ACCOUNT#:   [id number]


PT STATUS:   REG ER


:   1995


PHYSICIAN:   REBECCA HUFF MD


ADMIT DATE:   22/ER FS


                                  ***Signed***


Date of Exam:22





CT ABDOMEN/PELVIS W








EXAMINATION: CT abdomen and pelvis with intravenous contrast.





TECHNIQUE: Multiple contiguous axial images were obtained through


the abdomen and pelvis after the uneventful administration of


intravenous contrast. All CT scans use one or more of the


following dose optimizing techniques: automated exposure control,


MA and/or KvP adjustment based on patient size and exam type or


iterative reconstruction. 





HISTORY: Abdominal pain. Nausea. Diarrhea.





COMPARISON: None available.





FINDINGS: The heart is unremarkable. The included lung bases are


clear.  





The liver, spleen, pancreas, adrenal glands and kidneys have a


normal appearance. There is no pathologically enlarged mesenteric


or retroperitoneal adenopathy. 





The bowel loops are nondilated. The appendix is visualized in the


right lower quadrant and has a normal appearance. The colon is


decompressed with mild colon wall thickening. There is no free


fluid or free air. 





No acute osseous abnormalities.





Ureters and bladder are grossly normal. There is no free air,


loculated collection or adenopathy in the pelvis. 





IMPRESSION: Findings suggestive of diffuse colitis. No bowel


obstruction. No free fluid or free air. Normal appendix.





Dictated by: 





  Dictated on workstation # [id number]








Dict:   22


Trans:   22


[facility] [id number]





Interpreted by:     VANESSA NARANJO DO


Electronically signed by: VANESSA NARANJO DO 22


   Reviewed:  Reviewed by Me





Departure


Communication (Admissions)


Time/Spoke to Admitting Phy:  22:42


d/w Dr. Dunn and will admit as observation pt for bloody diarrhea with abdominal

pain and traveler's diarrhea.





Impression





   Primary Impression:  


   Colitis


   Additional Impressions:  


   Diffuse abdominal pain


   Nausea vomiting and diarrhea


   Bloody diarrhea


   Traveler's diarrhea


Disposition:  30 STILL A PATIENT


Condition:  Stable





Admissions


Decision to Admit Reason:  Admit from ER (General)


Decision to Admit/Date:  Sep 23, 2022


Time/Decision to Admit Time:  22:42





Departure-Patient Inst.


Referrals:  


GEENA SANTIAGO DO (PCP/Family)


Primary Care Physician











REBECCA HUFF MD               Sep 23, 2022 21:51

## 2022-09-23 NOTE — DIAGNOSTIC IMAGING REPORT
EXAMINATION: CT abdomen and pelvis with intravenous contrast.



TECHNIQUE: Multiple contiguous axial images were obtained through

the abdomen and pelvis after the uneventful administration of

intravenous contrast. All CT scans use one or more of the

following dose optimizing techniques: automated exposure control,

MA and/or KvP adjustment based on patient size and exam type or

iterative reconstruction. 



HISTORY: Abdominal pain. Nausea. Diarrhea.



COMPARISON: None available.



FINDINGS: The heart is unremarkable. The included lung bases are

clear.  



The liver, spleen, pancreas, adrenal glands and kidneys have a

normal appearance. There is no pathologically enlarged mesenteric

or retroperitoneal adenopathy. 



The bowel loops are nondilated. The appendix is visualized in the

right lower quadrant and has a normal appearance. The colon is

decompressed with mild colon wall thickening. There is no free

fluid or free air. 



No acute osseous abnormalities.



Ureters and bladder are grossly normal. There is no free air,

loculated collection or adenopathy in the pelvis. 



IMPRESSION: Findings suggestive of diffuse colitis. No bowel

obstruction. No free fluid or free air. Normal appendix.



Dictated by: 



  Dictated on workstation # [id number]

## 2022-09-24 VITALS — SYSTOLIC BLOOD PRESSURE: 138 MMHG | DIASTOLIC BLOOD PRESSURE: 81 MMHG

## 2022-09-24 VITALS — DIASTOLIC BLOOD PRESSURE: 81 MMHG | SYSTOLIC BLOOD PRESSURE: 142 MMHG

## 2022-09-24 VITALS — SYSTOLIC BLOOD PRESSURE: 136 MMHG | DIASTOLIC BLOOD PRESSURE: 83 MMHG

## 2022-09-24 VITALS — SYSTOLIC BLOOD PRESSURE: 120 MMHG | DIASTOLIC BLOOD PRESSURE: 81 MMHG

## 2022-09-24 LAB
BASOPHILS # BLD AUTO: 0 10^3/UL (ref 0–0.1)
BASOPHILS NFR BLD AUTO: 1 % (ref 0–10)
BUN/CREAT SERPL: 11
CALCIUM SERPL-MCNC: 8.7 MG/DL (ref 8.5–10.1)
CHLORIDE SERPL-SCNC: 104 MMOL/L (ref 98–107)
CO2 SERPL-SCNC: 21 MMOL/L (ref 21–32)
CREAT SERPL-MCNC: 1.05 MG/DL (ref 0.6–1.3)
EOSINOPHIL # BLD AUTO: 0.1 10^3/UL (ref 0–0.3)
EOSINOPHIL NFR BLD AUTO: 1 % (ref 0–10)
GFR SERPLBLD BASED ON 1.73 SQ M-ARVRAT: 99 ML/MIN
GLUCOSE SERPL-MCNC: 84 MG/DL (ref 70–105)
HCT VFR BLD CALC: 40 % (ref 40–54)
HGB BLD-MCNC: 13.9 G/DL (ref 13.3–17.7)
LYMPHOCYTES # BLD AUTO: 1.4 10^3/UL (ref 1–4)
LYMPHOCYTES NFR BLD AUTO: 23 % (ref 12–44)
MANUAL DIFFERENTIAL PERFORMED BLD QL: NO
MCH RBC QN AUTO: 29 PG (ref 25–34)
MCHC RBC AUTO-ENTMCNC: 35 G/DL (ref 32–36)
MCV RBC AUTO: 81 FL (ref 80–99)
MONOCYTES # BLD AUTO: 0.8 10^3/UL (ref 0–1)
MONOCYTES NFR BLD AUTO: 13 % (ref 0–12)
NEUTROPHILS # BLD AUTO: 3.7 10^3/UL (ref 1.8–7.8)
NEUTROPHILS NFR BLD AUTO: 62 % (ref 42–75)
PLATELET # BLD: 214 10^3/UL (ref 130–400)
PMV BLD AUTO: 9.6 FL (ref 9–12.2)
POTASSIUM SERPL-SCNC: 3.2 MMOL/L (ref 3.6–5)
SODIUM SERPL-SCNC: 137 MMOL/L (ref 135–145)
WBC # BLD AUTO: 6 10^3/UL (ref 4.3–11)

## 2022-09-24 RX ADMIN — DICYCLOMINE HYDROCHLORIDE SCH MG: 10 CAPSULE ORAL at 12:44

## 2022-09-24 RX ADMIN — SODIUM CHLORIDE SCH MLS/HR: 900 INJECTION, SOLUTION INTRAVENOUS at 00:39

## 2022-09-24 RX ADMIN — SODIUM CHLORIDE SCH MLS/HR: 900 INJECTION, SOLUTION INTRAVENOUS at 09:08

## 2022-09-24 RX ADMIN — DICYCLOMINE HYDROCHLORIDE SCH MG: 10 CAPSULE ORAL at 05:12

## 2022-09-24 NOTE — DISCHARGE INST-SIMPLE/STANDARD
Discharge Inst-Standard


Discharge Medications


New, Converted or Re-Newed RX:  Transmitted to Pharmacy





Patient Instructions/Follow Up


Plan of Care/Instructions/FU:  


Please continue to take your medications as written. Please follow up with


your primary care doctor to follow up this hospital stay.


Activity as Tolerated:  Yes


Discharge Diet:  No Restrictions (start bland and advance over the next couple 

of days)


Return to The Hospital For:  


Chest pain, abdominal pain, diarrhea, bloody stools, vomiting, shortness


of breath, fever, weakness, if you feel you are getting worse.











VICKI MOLINA MD              Sep 24, 2022 09:41

## 2022-09-24 NOTE — SHORT STAY SUMMARY-HOSPITALIST
History of Present Illness


HPI/Chief Complaint


Patient is a 26-year-old  male past medical history of hypertension who

presented to the emergency department due to abdominal pain and diarrhea.  He 

states he was in [place] for vacation when his symptoms started.  They started 

roughly 3 days into the medication and has been going on for 3 to 4 days now.  

He had nausea and abdominal pain. He had innumerable episodes of diarrhea.  He 

had been taking Pepto-Bismol and Imodium at home with minimal improvement.  His 

wife gave him a Zofran which did help with the nausea.  He had a subjective 

fever though did not have a thermometer on medication.  He did notice a little 

bit of blood in his stool but this is since resolved.  He reports feeling much 

better today and would like to try eating something.


Source:  patient


Date Seen


22


Time Seen by a Provider:  09:15


Attending Physician


Arnulfo Reed DO


PCP


Admitting Physician:


Vicki Dunn MD 








Attending Physician:


Vicki Dunn MD


Referring Physician





Date of Admission


Sep 24, 2022 at 00:05





Home Medications & Allergies


Home Medications


Reviewed patient Home Medication Reconciliation performed by pharmacy medication

reconciliations technician and/or nursing.


Patients Allergies have been reviewed.





Allergies





Allergies


Coded Allergies


  No Known Drug Allergies (Unverified21)








Past Medical-Social-Family Hx


Patient Social History


Marrital Status:  


Employed/Student:  employed


Tobacco Use?:  No


Smoking Status:  Never a Smoker


Smokeless type used:  Chew


Smokeless Tobacco Frequency:  Current Everyday User


Use of E-Cig and/or Vaping dev:  No


Substance use?:  No


Alcohol Use?:  No


Pt feels they are or have been:  No





Immunizations Up To Date


Tetanus Booster (TDap):  Unknown


Hepatitis A:  No


Hepatitis B:  No





Current Status


Advance Directives:  No


Communicates:  Verbally


Primary Language:  English


Preferred Spoken Language:  English


Is interpretation needed?:  No


Implanted or Applied Medical D:  None





Past Medical History


High Cholesterol





Review of Systems


Constitutional:  chills, fever


EENTM:  no symptoms reported


Respiratory:  no symptoms reported


Cardiovascular:  no symptoms reported


Gastrointestinal:  see HPI, abdominal pain, diarrhea; No hematemesis; nausea


Genitourinary:  no symptoms reported


Musculoskeletal:  no symptoms reported


Skin:  no symptoms reported


Psychiatric/Neurological:  No Symptoms Reported





Physical Exam


Physical Exam


Vital Signs





Vital Signs - First Documented








 22





 21:20


 


Temp 36.4


 


Pulse 84


 


Resp 16


 


B/P (MAP) 167/100 (122)


 


Pulse Ox 98


 


O2 Delivery Room Air





Capillary Refill : Less Than 3 Seconds


Height, Weight, BMI


Height: '"


Weight: lbs. oz. kg; 42.23 BMI


Method:


General Appearance:  No Apparent Distress, WD/WN, Obese


HEENT:  PERRL/EOMI, Moist Mucous Membranes; No Scleral Icterus (L), No Scleral 

Icterus (R)


Neck:  Normal Inspection, Supple


Respiratory:  Lungs Clear, No Accessory Muscle Use, No Respiratory Distress


Cardiovascular:  Regular Rate, Rhythm, No JVD, No Murmur


Gastrointestinal:  Normal Bowel Sounds, Non Tender, Soft; No Distended, No 

Tenderness


Extremity:  Normal Capillary Refill, No Calf Tenderness, No Pedal Edema


Neurologic/Psychiatric:  Alert, Oriented x3, Normal Mood/Affect


Skin:  Normal Color, Warm/Dry





Results


Results/Procedures


Labs


Laboratory Tests


22 21:51








22 05:15








Patient resulted labs reviewed.


Imaging:  Reviewed Imaging Report


Imaging


                 [facility] [place], [place]





NAME:   MARGARITA MORALES


[facility] REC#:   [id number]


ACCOUNT#:   [id number]


PT STATUS:   REG ER


:   1995


PHYSICIAN:   REBECCA HUFF MD


ADMIT DATE:   22/ER FS


                                  ***Signed***


Date of Exam:22





CT ABDOMEN/PELVIS W








EXAMINATION: CT abdomen and pelvis with intravenous contrast.





TECHNIQUE: Multiple contiguous axial images were obtained through


the abdomen and pelvis after the uneventful administration of


intravenous contrast. All CT scans use one or more of the


following dose optimizing techniques: automated exposure control,


MA and/or KvP adjustment based on patient size and exam type or


iterative reconstruction. 





HISTORY: Abdominal pain. Nausea. Diarrhea.





COMPARISON: None available.





FINDINGS: The heart is unremarkable. The included lung bases are


clear.  





The liver, spleen, pancreas, adrenal glands and kidneys have a


normal appearance. There is no pathologically enlarged mesenteric


or retroperitoneal adenopathy. 





The bowel loops are nondilated. The appendix is visualized in the


right lower quadrant and has a normal appearance. The colon is


decompressed with mild colon wall thickening. There is no free


fluid or free air. 





No acute osseous abnormalities.





Ureters and bladder are grossly normal. There is no free air,


loculated collection or adenopathy in the pelvis. 





IMPRESSION: Findings suggestive of diffuse colitis. No bowel


obstruction. No free fluid or free air. Normal appendix.





Dictated by: 





  Dictated on workstation # [id number]








Dict:   22


Trans:   22


[facility] [id number]





Interpreted by:     VANESSA NARANJO DO


Electronically signed by: VANESSA NARANJO DO 22





Short Stay Diagnosis


Discharge Diagnosis-Short Stay


Admission Diagnosis


Travelers' Diarrhea


Final Discharge Diagnosis


Colitis from Travelers' Diarrhea





Conclusion


Plan


Colitis from Travelers' Diarrhea


   Continue azithromycin


   Continue supportive treatment


   Advance diet as tolerated


   Will DC home if tolerating diet and pain controlled





HTN


   BP improved


   Continue home meds





Diagnosis/Problems


Diagnosis/Problems





(1) Bloody diarrhea


Status:  Acute


(2) Colitis


Status:  Acute


(3) Traveler's diarrhea


Status:  Acute











VICKI DUNN MD              Sep 24, 2022 09:37